# Patient Record
Sex: FEMALE | Race: BLACK OR AFRICAN AMERICAN | NOT HISPANIC OR LATINO | Employment: FULL TIME | URBAN - METROPOLITAN AREA
[De-identification: names, ages, dates, MRNs, and addresses within clinical notes are randomized per-mention and may not be internally consistent; named-entity substitution may affect disease eponyms.]

---

## 2017-05-26 ENCOUNTER — OFFICE VISIT (OUTPATIENT)
Dept: OBSTETRICS AND GYNECOLOGY | Facility: CLINIC | Age: 32
End: 2017-05-26
Payer: COMMERCIAL

## 2017-05-26 VITALS
HEIGHT: 61 IN | WEIGHT: 222.25 LBS | DIASTOLIC BLOOD PRESSURE: 82 MMHG | BODY MASS INDEX: 41.96 KG/M2 | SYSTOLIC BLOOD PRESSURE: 116 MMHG

## 2017-05-26 DIAGNOSIS — N89.8 VAGINAL DISCHARGE: ICD-10-CM

## 2017-05-26 DIAGNOSIS — Z12.4 ROUTINE PAPANICOLAOU SMEAR: Primary | ICD-10-CM

## 2017-05-26 LAB
C TRACH DNA SPEC QL NAA+PROBE: NOT DETECTED
N GONORRHOEA DNA SPEC QL NAA+PROBE: NOT DETECTED

## 2017-05-26 PROCEDURE — 88175 CYTOPATH C/V AUTO FLUID REDO: CPT

## 2017-05-26 PROCEDURE — 87591 N.GONORRHOEAE DNA AMP PROB: CPT

## 2017-05-26 PROCEDURE — 99395 PREV VISIT EST AGE 18-39: CPT | Mod: S$GLB,,, | Performed by: OBSTETRICS & GYNECOLOGY

## 2017-05-26 PROCEDURE — 99999 PR PBB SHADOW E&M-EST. PATIENT-LVL II: CPT | Mod: PBBFAC,,, | Performed by: OBSTETRICS & GYNECOLOGY

## 2017-05-26 PROCEDURE — 87480 CANDIDA DNA DIR PROBE: CPT

## 2017-05-26 NOTE — PROGRESS NOTES
"HPI:   31 y.o.   OB History      Para Term  AB Living    0         SAB TAB Ectopic Multiple Live Births               Patient's last menstrual period was 2017 (exact date).    Patient is  here for her annual gynecologic exam.  She has no complaints.     ROS:  GENERAL: No fever, chills, fatigability or weight loss.  SKIN: No rashes, itching or changes in color or texture of skin.  HEAD: No headaches or recent head trauma.  EYES: Visual acuity fine. No photophobia, ocular pain or diplopia.  EARS: Denies ear pain, discharge or vertigo.  NOSE: No loss of smell, no epistaxis or postnasal drip.  MOUTH & THROAT: No hoarseness or change in voice. No excessive gum bleeding.  NODES: Denies swollen glands.  CHEST: Denies RAMIREZ, cyanosis, wheezing, cough and sputum production.  CARDIOVASCULAR: Denies chest pain, PND, orthopnea or reduced exercise tolerance.  ABDOMEN: Appetite fine. No weight loss. Denies diarrhea, abdominal pain, hematemesis or blood in stool.  URINARY: No flank pain, dysuria or hematuria.  PERIPHERAL VASCULAR: No claudication or cyanosis.  MUSCULOSKELETAL: No joint stiffness or swelling. Denies back pain.  NEUROLOGIC: No history of seizures, paralysis, alteration of gait or coordination.    PE:   /82   Ht 5' 1" (1.549 m)   Wt 100.8 kg (222 lb 3.6 oz)   LMP 2017 (Exact Date)   BMI 41.99 kg/m²   APPEARANCE: Well nourished, well developed, in no acute distress.  NECK: Neck symmetric without masses or thyromegaly.  BREASTS: Symmetrical, no skin changes or visible lesions. No palpable masses, nipple discharge or adenopathy bilaterally.  ABDOMEN: Flat. Soft. No tenderness or masses. No hepatosplenomegaly. No hernias. No CVA tenderness.  VULVA: No lesions. Normal female genitalia.  URETHRAL MEATUS: Normal size and location, no lesions, no prolapse.  URETHRA: No masses, tenderness, prolapse or scarring.  VAGINA: Moist and well rugated, no discharge, no significant cystocele or " rectocele.  CERVIX: No lesions and discharge. PAP done.  UTERUS: Normal size, regular shape, mobile, non-tender, bladder base nontender.  ADNEXA: No masses, tenderness or CDS nodularity.  ANUS PERINEUM: Normal.    PROCEDURES:  Pap smear    Assessment:  Normal Gynecologic Exam    Plan:  Mammogram and Colonoscopy if indicated by current recommendations.  Return to clinic in one year or for any problems or complaints.  1. Cultures dohne, rec yeast, no dc seen  2. Trying for pregnancy, ocpsin past, long hx of irrg cycles, may need clomid?

## 2017-05-27 LAB
CANDIDA RRNA VAG QL PROBE: NEGATIVE
G VAGINALIS RRNA GENITAL QL PROBE: NEGATIVE
T VAGINALIS RRNA GENITAL QL PROBE: NEGATIVE

## 2017-05-29 ENCOUNTER — TELEPHONE (OUTPATIENT)
Dept: OBSTETRICS AND GYNECOLOGY | Facility: CLINIC | Age: 32
End: 2017-05-29

## 2017-05-29 ENCOUNTER — HOSPITAL ENCOUNTER (OUTPATIENT)
Dept: RADIOLOGY | Facility: HOSPITAL | Age: 32
Discharge: HOME | End: 2017-05-29
Attending: UROLOGY
Payer: COMMERCIAL

## 2017-05-29 ENCOUNTER — OFFICE VISIT (OUTPATIENT)
Dept: UROLOGY | Facility: CLINIC | Age: 32
End: 2017-05-29
Payer: COMMERCIAL

## 2017-05-29 VITALS
BODY MASS INDEX: 41.91 KG/M2 | SYSTOLIC BLOOD PRESSURE: 116 MMHG | HEIGHT: 61 IN | TEMPERATURE: 97 F | HEART RATE: 83 BPM | WEIGHT: 222 LBS | DIASTOLIC BLOOD PRESSURE: 78 MMHG

## 2017-05-29 DIAGNOSIS — M54.9 BACK PAIN, UNSPECIFIED BACK LOCATION, UNSPECIFIED BACK PAIN LATERALITY, UNSPECIFIED CHRONICITY: ICD-10-CM

## 2017-05-29 DIAGNOSIS — N20.0 NEPHROLITHIASIS: Primary | ICD-10-CM

## 2017-05-29 DIAGNOSIS — N20.0 NEPHROLITHIASIS: ICD-10-CM

## 2017-05-29 DIAGNOSIS — Z87.440 HISTORY OF UTI: ICD-10-CM

## 2017-05-29 DIAGNOSIS — R31.29 MICROHEMATURIA: ICD-10-CM

## 2017-05-29 LAB
BACTERIA #/AREA URNS AUTO: ABNORMAL /HPF
CAOX CRY UR QL COMP ASSIST: ABNORMAL
MICROSCOPIC COMMENT: ABNORMAL
RBC #/AREA URNS AUTO: 2 /HPF (ref 0–4)
SQUAMOUS #/AREA URNS AUTO: 5 /HPF
WBC #/AREA URNS AUTO: 1 /HPF (ref 0–5)

## 2017-05-29 PROCEDURE — 51798 US URINE CAPACITY MEASURE: CPT | Mod: S$GLB,,, | Performed by: UROLOGY

## 2017-05-29 PROCEDURE — 99999 PR PBB SHADOW E&M-EST. PATIENT-LVL III: CPT | Mod: PBBFAC,,, | Performed by: UROLOGY

## 2017-05-29 PROCEDURE — 87086 URINE CULTURE/COLONY COUNT: CPT

## 2017-05-29 PROCEDURE — 74000 XR ABDOMEN AP 1 VIEW: CPT | Mod: TC

## 2017-05-29 PROCEDURE — 99215 OFFICE O/P EST HI 40 MIN: CPT | Mod: 25,S$GLB,, | Performed by: UROLOGY

## 2017-05-29 PROCEDURE — 81001 URINALYSIS AUTO W/SCOPE: CPT

## 2017-05-29 PROCEDURE — 74000 XR ABDOMEN AP 1 VIEW: CPT | Mod: 26,,, | Performed by: RADIOLOGY

## 2017-05-29 NOTE — PROGRESS NOTES
HPI:  Lynn Green is a 31 y.o. year old female that  presents with No chief complaint on file.  .  This patient refers himself to the office with history of kidney stones and urinary tract infection.  She had negative workup for microscopic hematuria in February of last year by Dr. [facility].  Cytology was negative at that time.  As patient is new to me.    Patient spontaneously passed a kidney stone approximately 2 years ago.  She currently has chief complaint of bilateral low back pain which is worse in the morning.  She has no nausea vomiting or gross hematuria    He states that she recently finished a course of antibiotics for urinary tract infection.  She does have occasional dysuria.  Bladder scan postvoid residual today is 0    Further chart review shows a GFR greater than 60 in November 2015.  In May of this year she had a negative GC and Chlamydia culture.  In April of last year a renal stone study showed bilateral nonobstructing small kidney stones      Past Medical History:   Diagnosis Date    Allergy     Cystitis     Heart murmur     as a child    History of hematuria     Kidney stones     Kidney stones     Urinary tract infection      Social History     Social History    Marital status: Single     Spouse name: N/A    Number of children: N/A    Years of education: N/A     Occupational History     [employer]     Social History Main Topics    Smoking status: Never Smoker    Smokeless tobacco: Never Used    Alcohol use Yes    Drug use: No    Sexual activity: Yes     Partners: Male     Birth control/ protection: None      Comment: Single.     Other Topics Concern    Not on file     Social History Narrative    No narrative on file     History reviewed. No pertinent surgical history.  Family History   Problem Relation Age of Onset    Diabetes Mother     Hypertension Father     Prostate cancer Father     Breast cancer Neg Hx     Colon cancer Neg Hx   "   Ovarian cancer Neg Hx     Kidney disease Neg Hx            Review of Systems  The patient has no chest pains.  The patient has no shortness of breath  Patient wears        glasses.  All other review of systems are negative.      Physical Exam:  /78   Pulse 83   Temp 97.2 °F (36.2 °C)   Ht 5' 1" (1.549 m)   Wt 100.7 kg (222 lb)   LMP 05/20/2017 (Exact Date)   BMI 41.95 kg/m²   General appearance: alert, cooperative, no distress  Constitutional:Oriented to person, place, and time.appears well-developed and well-nourished.   HEENT: Normocephalic, atraumatic, neck symmetrical, no nasal discharge   Eyes: conjunctivae/corneas clear, PERRL, EOM's intact  Lungs: clear to auscultation bilaterally, no dullness to percussion bilaterally  Heart: regular rate and rhythm without rub; no displacement of the PMI   Abdomen: soft, non-tender; bowel sounds normoactive; no organomegaly  :Urethral meatus without lesion, no urethral masses noted, bladder nontender /nondistended, vagina normal appearing,      no      cystocele, anus and perineum without lesions, no adnexal or uterine masses noted.  Extremities: extremities symmetric; no clubbing, cyanosis, or edema  Integument: Skin color, texture, turgor normal; no rashes; hair distrubution normal  Neurologic: Alert and oriented X 3, normal strength, normal coordination and gait  Psychiatric: no pressured speech; normal affect; no evidence of impaired cognition     LABS:    Complete Blood Count  Lab Results   Component Value Date    RBC 4.52 11/12/2015    HGB 12.9 11/12/2015    HCT 39.4 11/12/2015    MCV 87 11/12/2015    MCH 28.5 11/12/2015    MCHC 32.7 11/12/2015    RDW 14.0 11/12/2015     11/12/2015    MPV 9.5 11/12/2015    GRAN 2.0 11/12/2015    GRAN 40.7 11/12/2015    LYMPH 2.4 11/12/2015    LYMPH 48.8 (H) 11/12/2015    MONO 0.4 11/12/2015    MONO 7.7 11/12/2015    EOS 0.1 11/12/2015    BASO 0.05 11/12/2015    EOSINOPHIL 1.8 11/12/2015    BASOPHIL 1.0 " 11/12/2015    DIFFMETHOD Automated 11/12/2015       Comprehensive Metabolic Panel  Lab Results   Component Value Date    GLU 92 11/12/2015    BUN 7 11/12/2015    CREATININE 0.7 11/12/2015     11/12/2015    K 3.8 11/12/2015     11/12/2015    PROT 7.6 11/12/2015    ALBUMIN 3.8 11/12/2015    BILITOT 0.3 11/12/2015    AST 18 11/12/2015    ALKPHOS 103 11/12/2015    CO2 27 11/12/2015    ALT 17 11/12/2015    ANIONGAP 7 (L) 11/12/2015    EGFRNONAA >60 11/12/2015    ESTGFRAFRICA >60 11/12/2015       PSA  No results found for: PSA      Assessment:    ICD-10-CM ICD-9-CM    1. Nephrolithiasis N20.0 592.0 X-Ray Abdomen AP 1 View   2. Back pain, unspecified back location, unspecified back pain laterality, unspecified chronicity M54.9 724.5    3. Microhematuria R31.29 599.72 Urinalysis Microscopic      Urine culture   4. History of UTI Z87.440 V13.02 Bladder scan     The primary encounter diagnosis was Nephrolithiasis. Diagnoses of Back pain, unspecified back location, unspecified back pain laterality, unspecified chronicity, Microhematuria, and History of UTI were also pertinent to this visit.      Plan: Numbers 1 and 2 nephrolithiasis and back pain.  Plan.  I discussed with the patient that her symptoms do not sound like spontaneous passage of a ureteral stone.  I offered to proceed with stone protocol CT versus check of KUB.  Patient voices understanding of options and wants to get a KUB which I think reasonable.  I recommend Tylenol or Motrin for low back pain    #3 microhematuria.  Plan.  Patient has already had a negative workup would repeat workup not needed    #4.  History of urinary tract infection.Patient's urine will be cultured and once results are available ,we will contact the patient if antibiotics are indicated.    Follow-up 3 months or sooner if needed  Orders Placed This Encounter   Procedures    Urine culture    X-Ray Abdomen AP 1 View    Urinalysis Microscopic    Bladder scan           Reilly PABON  CHEN Watkins MD    PLEASE NOTE:  Please be advised that portions of this note were dictated using voice recognition software and may contain dictation related errors in spelling/grammar/appropriate pronouns/syntax or other errors that might have not been found and or corrected on text review.

## 2017-05-29 NOTE — TELEPHONE ENCOUNTER
----- Message from Michael A. Wiedemann, MD sent at 5/29/2017  3:54 PM CDT -----  Yay, tell cultures negatvie for std and yeas/bv, yay

## 2017-05-31 LAB — BACTERIA UR CULT: NORMAL

## 2017-09-21 ENCOUNTER — OFFICE VISIT (OUTPATIENT)
Dept: OBSTETRICS AND GYNECOLOGY | Facility: CLINIC | Age: 32
End: 2017-09-21
Payer: COMMERCIAL

## 2017-09-21 VITALS
SYSTOLIC BLOOD PRESSURE: 116 MMHG | WEIGHT: 220.38 LBS | HEART RATE: 73 BPM | DIASTOLIC BLOOD PRESSURE: 80 MMHG | RESPIRATION RATE: 13 BRPM | HEIGHT: 61 IN | BODY MASS INDEX: 41.61 KG/M2

## 2017-09-21 DIAGNOSIS — N76.0 VAGINOSIS: ICD-10-CM

## 2017-09-21 DIAGNOSIS — R10.2 PELVIC PAIN IN FEMALE: Primary | ICD-10-CM

## 2017-09-21 LAB
BILIRUB SERPL-MCNC: ABNORMAL MG/DL
BLOOD URINE, POC: ABNORMAL
COLOR, POC UA: ABNORMAL
GLUCOSE UR QL STRIP: ABNORMAL
KETONES UR QL STRIP: ABNORMAL
LEUKOCYTE ESTERASE URINE, POC: ABNORMAL
NITRITE, POC UA: ABNORMAL
PH, POC UA: 6
PROTEIN, POC: ABNORMAL
SPECIFIC GRAVITY, POC UA: 1.01
UROBILINOGEN, POC UA: ABNORMAL

## 2017-09-21 PROCEDURE — 81002 URINALYSIS NONAUTO W/O SCOPE: CPT | Mod: S$GLB,,, | Performed by: OBSTETRICS & GYNECOLOGY

## 2017-09-21 PROCEDURE — 87480 CANDIDA DNA DIR PROBE: CPT

## 2017-09-21 PROCEDURE — 81001 URINALYSIS AUTO W/SCOPE: CPT | Mod: S$GLB,,, | Performed by: OBSTETRICS & GYNECOLOGY

## 2017-09-21 PROCEDURE — 99999 PR PBB SHADOW E&M-EST. PATIENT-LVL III: CPT | Mod: PBBFAC,,, | Performed by: OBSTETRICS & GYNECOLOGY

## 2017-09-21 PROCEDURE — 87660 TRICHOMONAS VAGIN DIR PROBE: CPT

## 2017-09-21 PROCEDURE — 3008F BODY MASS INDEX DOCD: CPT | Mod: S$GLB,,, | Performed by: OBSTETRICS & GYNECOLOGY

## 2017-09-21 PROCEDURE — 99213 OFFICE O/P EST LOW 20 MIN: CPT | Mod: 25,S$GLB,, | Performed by: OBSTETRICS & GYNECOLOGY

## 2017-09-21 RX ORDER — FLUCONAZOLE 150 MG/1
150 TABLET ORAL ONCE
Qty: 1 TABLET | Refills: 0 | Status: SHIPPED | OUTPATIENT
Start: 2017-09-21 | End: 2017-09-21

## 2017-09-21 NOTE — PROGRESS NOTES
Subjective:    Patient ID: Lynn Green is a 31 y.o. y.o. female.     Chief Complaint:   Chief Complaint   Patient presents with    Pelvic Pain     on the sides    Vaginal Itching    Vaginal Discharge       History of Present Illness   Lynn presents today complaining of pelvic pain, suprapubic and LLQ for about 1 week. She describes the suprapubic pain as a pressure-like feeling in her bladder. She also reports about 1 day of vaginal discharge with itching.  Denies dysuria, but does have itching with urination.    Patient's last menstrual period was 09/01/2017. She also reports that she and her partner have been trying to conceive for about 1 month.    ROS:   CONSTITUTIONAL: Negative for fever, chills, diaphoresis, weakness, fatigue, weight loss, weight gain  GASTROINTESTINAL: negative for abdominal pain, flank pain, nausea, vomiting, diarrhea, constipation, black stool, blood in stool  BREAST: negative for breast  tenderness, breast mass, nipple discharge, or skin changes  GENITOURINARY: genital discharge, pelvic pain, Denies: dysuria, frequency/urgency, hematuria  HEMATOLOGIC/LYMPHATIC: negative for swollen lymph nodes, bleeding, bruising  MUSCULOSKELETAL: negative for back pain, joint pain, joint stiffness, joint swelling, muscle pain, muscle weakness  ENDOCRINE: negative for polydipsia/polyuria, palpitations, skin changes, temperature intolerance, unexpected weight changes      Objective:    Vital Signs:  Vitals:    09/21/17 0733   BP: 116/80   Pulse: 73   Resp: 13       Physical Exam:  General:  alert, cooperative, no distress   Neuro/Psych: AAOx3, appropriate mood and affect   Head: Normocephalic, atraumatic   Neck: Supple, normal ROM   Resp: Normal effort   Skin:  Skin color, texture, turgor normal. No rashes or lesions   Abdomen:  soft, nontender, no palpable masses   Pelvis: External genitalia: normal general appearance  Urinary system: urethral meatus normal, bladder nontender  Vaginal: normal  mucosa without prolapse or lesions, discharge, white and thick  Cervix: normal appearance, no CMT  Uterus: normal size, shape, position  Adnexa: normal size, nontender bilaterally         Assessment:      1. Pelvic pain in female    2. Vaginosis          Plan:      Pelvic pain in female  -     POCT urinalysis, dipstick or tablet reag    Vaginosis  -     Vaginosis Screen by DNA Probe  -     fluconazole (DIFLUCAN) 150 MG Tab; Take 1 tablet (150 mg total) by mouth once.  Dispense: 1 tablet; Refill: 0      We also discussed starting a PNV if TTC.

## 2017-10-15 ENCOUNTER — HOSPITAL ENCOUNTER (EMERGENCY)
Facility: HOSPITAL | Age: 32
Discharge: HOME | End: 2017-10-15
Attending: EMERGENCY MEDICINE
Payer: COMMERCIAL

## 2017-10-15 VITALS
HEIGHT: 62 IN | HEART RATE: 80 BPM | BODY MASS INDEX: 40.12 KG/M2 | TEMPERATURE: 98 F | SYSTOLIC BLOOD PRESSURE: 150 MMHG | RESPIRATION RATE: 17 BRPM | DIASTOLIC BLOOD PRESSURE: 85 MMHG | WEIGHT: 218 LBS

## 2017-10-15 DIAGNOSIS — N30.01 ACUTE CYSTITIS WITH HEMATURIA: Primary | ICD-10-CM

## 2017-10-15 LAB
B-HCG UR QL: NEGATIVE
BACTERIA #/AREA URNS HPF: ABNORMAL /HPF
BILIRUB UR QL STRIP: NEGATIVE
CLARITY UR: CLEAR
COLOR UR: ABNORMAL
GLUCOSE UR QL STRIP: ABNORMAL
HGB UR QL STRIP: ABNORMAL
HYALINE CASTS #/AREA URNS LPF: 0 /LPF
KETONES UR QL STRIP: NEGATIVE
LEUKOCYTE ESTERASE UR QL STRIP: ABNORMAL
MICROSCOPIC COMMENT: ABNORMAL
NITRITE UR QL STRIP: NEGATIVE
PH UR STRIP: 7 [PH] (ref 5–8)
PROT UR QL STRIP: ABNORMAL
RBC #/AREA URNS HPF: >100 /HPF (ref 0–4)
SP GR UR STRIP: 1.01 (ref 1–1.03)
SQUAMOUS #/AREA URNS HPF: 1 /HPF
URN SPEC COLLECT METH UR: ABNORMAL
UROBILINOGEN UR STRIP-ACNC: NEGATIVE EU/DL
WBC #/AREA URNS HPF: >100 /HPF (ref 0–5)

## 2017-10-15 PROCEDURE — 87086 URINE CULTURE/COLONY COUNT: CPT

## 2017-10-15 PROCEDURE — 81000 URINALYSIS NONAUTO W/SCOPE: CPT

## 2017-10-15 PROCEDURE — 87186 SC STD MICRODIL/AGAR DIL: CPT

## 2017-10-15 PROCEDURE — 99283 EMERGENCY DEPT VISIT LOW MDM: CPT | Mod: 25

## 2017-10-15 PROCEDURE — 81025 URINE PREGNANCY TEST: CPT

## 2017-10-15 PROCEDURE — 96372 THER/PROPH/DIAG INJ SC/IM: CPT

## 2017-10-15 PROCEDURE — 63600175 PHARM REV CODE 636 W HCPCS: Performed by: EMERGENCY MEDICINE

## 2017-10-15 PROCEDURE — 87077 CULTURE AEROBIC IDENTIFY: CPT

## 2017-10-15 PROCEDURE — 87088 URINE BACTERIA CULTURE: CPT

## 2017-10-15 RX ORDER — GENTAMICIN SULFATE 40 MG/ML
80 INJECTION, SOLUTION INTRAMUSCULAR; INTRAVENOUS
Status: DISCONTINUED | OUTPATIENT
Start: 2017-10-15 | End: 2017-10-15

## 2017-10-15 RX ORDER — GENTAMICIN SULFATE 10 MG/ML
80 INJECTION, SOLUTION INTRAMUSCULAR; INTRAVENOUS
Status: DISCONTINUED | OUTPATIENT
Start: 2017-10-15 | End: 2017-10-15

## 2017-10-15 RX ORDER — NITROFURANTOIN 25; 75 MG/1; MG/1
100 CAPSULE ORAL 2 TIMES DAILY
Qty: 20 CAPSULE | Refills: 0 | Status: SHIPPED | OUTPATIENT
Start: 2017-10-15 | End: 2017-10-25

## 2017-10-15 RX ADMIN — GENTAMICIN SULFATE 80 MG: 40 INJECTION, SOLUTION INTRAMUSCULAR; INTRAVENOUS at 03:10

## 2017-10-15 NOTE — ED PROVIDER NOTES
Encounter Date: 10/15/2017       History     Chief Complaint   Patient presents with    Dysuria     States has been having pain on urination for about 24 hours. States has nausea but no vomiting.     This 31-year-old black female with a history of urinary tract infections complains she's been having dysuria for about 24 hours and discomfort and a little bit of left flank pain.  She denies any fever nausea or vomiting.  She has no other medical problems.  She denies pregnancy.          Review of patient's allergies indicates:   Allergen Reactions    Bactrim [sulfamethoxazole-trimethoprim] Hives     Hives (skin)^     Past Medical History:   Diagnosis Date    Allergy     Cystitis     Heart murmur     as a child    History of hematuria     Kidney stones     Kidney stones     Urinary tract infection      Past Surgical History:   Procedure Laterality Date    CYSTOSCOPY       Family History   Problem Relation Age of Onset    Diabetes Mother     Hypertension Father     Prostate cancer Father     Cancer Father      bladder cancer    Breast cancer Neg Hx     Colon cancer Neg Hx     Ovarian cancer Neg Hx     Kidney disease Neg Hx      Social History   Substance Use Topics    Smoking status: Never Smoker    Smokeless tobacco: Never Used    Alcohol use Yes      Comment: socially     Review of Systems   Genitourinary: Positive for dysuria, flank pain and frequency.   All other systems reviewed and are negative.      Physical Exam     Initial Vitals [10/15/17 0249]   BP Pulse Resp Temp SpO2   (!) 166/99 86 20 97.6 °F (36.4 °C) --      MAP       121.33         Physical Exam    Nursing note and vitals reviewed.  Constitutional: She appears well-developed and well-nourished.   HENT:   Mouth/Throat: Oropharynx is clear and moist.   Eyes: EOM are normal. Pupils are equal, round, and reactive to light.   Neck: Normal range of motion. Neck supple.   Cardiovascular: Normal rate and regular rhythm.   Pulmonary/Chest:  Breath sounds normal. She has no wheezes.   Abdominal: Soft. Bowel sounds are normal.   Musculoskeletal: Normal range of motion. She exhibits no edema.   Neurological: She is alert and oriented to person, place, and time.   Skin: Skin is warm and dry.   Psychiatric: She has a normal mood and affect.         ED Course   Procedures  Labs Reviewed   URINALYSIS - Abnormal; Notable for the following:        Result Value    Color, UA Red (*)     Protein, UA 2+ (*)     Glucose, UA Trace (*)     Occult Blood UA 3+ (*)     Leukocytes, UA 1+ (*)     All other components within normal limits   URINALYSIS MICROSCOPIC - Abnormal; Notable for the following:     RBC, UA >100 (*)     WBC, UA >100 (*)     Bacteria, UA Moderate (*)     All other components within normal limits   PREGNANCY TEST, URINE RAPID                               ED Course      Clinical Impression:   The encounter diagnosis was Acute cystitis with hematuria.    Disposition:   Disposition: Discharged  Condition: Stable                        Kenneth Somers MD  10/15/17 0351

## 2017-10-17 LAB — BACTERIA UR CULT: NORMAL

## 2017-10-18 NOTE — PHYSICIAN QUERY
PT Name: Lynn Green  MR #: [id number]     Physician Query Form - Documentation Clarification      CDS/: Jaelyn Angel               Contact information:[email or web address]    This form is a permanent document in the medical record.     Query Date: October 18, 2017    By submitting this query, we are merely seeking further clarification of documentation. Please utilize your independent clinical judgment when addressing the question(s) below.    The Medical record reflects the following:    Supporting Clinical Findings Location in Medical Record   ESCHERICHIA COLI  >100,000 cfu/ml       Urine Culture     The encounter diagnosis was Acute cystitis with hematuria       ED Summary                                                                            Dr. Somers,   Please review the urine culture and add any additional diagnosis if necessary.    Provider Use Only            No further Dx needed.                                                                                                                   [  ] Clinically undetermined

## 2017-12-18 ENCOUNTER — OFFICE VISIT (OUTPATIENT)
Dept: URGENT CARE | Facility: CLINIC | Age: 32
End: 2017-12-18

## 2017-12-18 VITALS
OXYGEN SATURATION: 98 % | HEIGHT: 61 IN | SYSTOLIC BLOOD PRESSURE: 125 MMHG | HEART RATE: 89 BPM | BODY MASS INDEX: 41.16 KG/M2 | RESPIRATION RATE: 18 BRPM | TEMPERATURE: 98 F | DIASTOLIC BLOOD PRESSURE: 89 MMHG | WEIGHT: 218 LBS

## 2017-12-18 DIAGNOSIS — N30.00 ACUTE CYSTITIS WITHOUT HEMATURIA: ICD-10-CM

## 2017-12-18 DIAGNOSIS — R30.0 DYSURIA: Primary | ICD-10-CM

## 2017-12-18 LAB
B-HCG UR QL: NEGATIVE
BILIRUB UR QL STRIP: NEGATIVE
CTP QC/QA: YES
GLUCOSE UR QL STRIP: NEGATIVE
KETONES UR QL STRIP: NEGATIVE
LEUKOCYTE ESTERASE UR QL STRIP: POSITIVE
PH, POC UA: 5.5 (ref 5–8)
POC BLOOD, URINE: POSITIVE
POC NITRATES, URINE: ABNORMAL
PROT UR QL STRIP: POSITIVE
SP GR UR STRIP: 1.02 (ref 1–1.03)
UROBILINOGEN UR STRIP-ACNC: NORMAL (ref 0.1–1.1)

## 2017-12-18 PROCEDURE — 81003 URINALYSIS AUTO W/O SCOPE: CPT | Mod: QW,S$GLB,, | Performed by: NURSE PRACTITIONER

## 2017-12-18 PROCEDURE — 81025 URINE PREGNANCY TEST: CPT | Mod: S$GLB,,, | Performed by: NURSE PRACTITIONER

## 2017-12-18 PROCEDURE — 99213 OFFICE O/P EST LOW 20 MIN: CPT | Mod: 25,S$GLB,, | Performed by: NURSE PRACTITIONER

## 2017-12-18 RX ORDER — PHENAZOPYRIDINE HYDROCHLORIDE 200 MG/1
200 TABLET, FILM COATED ORAL
Qty: 6 TABLET | Refills: 0 | Status: SHIPPED | OUTPATIENT
Start: 2017-12-18 | End: 2018-04-12 | Stop reason: ALTCHOICE

## 2017-12-18 RX ORDER — NITROFURANTOIN 25; 75 MG/1; MG/1
100 CAPSULE ORAL 2 TIMES DAILY
Qty: 14 CAPSULE | Refills: 0 | Status: SHIPPED | OUTPATIENT
Start: 2017-12-18 | End: 2018-04-12 | Stop reason: SDUPTHER

## 2017-12-18 NOTE — PATIENT INSTRUCTIONS
Understanding Urinary Tract Infections (UTIs)  Most UTIs are caused by bacteria, although they may also be caused by viruses or fungi. Bacteria from the bowel are the most common source of infection. The infection may start because of any of the following:  · Sexual activity. During sex, bacteria can travel from the penis, vagina, or rectum into the urethra.   · Bacteria on the skin outside the rectum may travel into the urethra. This is more common in women since the rectum and urethra are closer to each other than in men. Wiping from front to back after using the toilet and keeping the area clean can help prevent germs from getting to the urethra.  · Blockage of urine flow through the urinary tract. If urine sits too long, germs may start to grow out of control.      Parts of the urinary tract  The infection can occur in any part of the urinary tract.  · The kidneys collect and store urine.  · The ureters carry urine from the kidneys to the bladder.  · The bladder holds urine until you are ready to let it out.  · The urethra carries urine from the bladder out of the body. It is shorter in women, so bacteria can move through it more easily. The urethra is longer in men, so a UTI is less likely to reach the bladder or kidneys in men.  Date Last Reviewed: 1/1/2017  © 8004-2027 [employer]. [address] [place] [address], [place] [address]. All rights reserved. This information is not intended as a substitute for professional medical care. Always follow your healthcare professional's instructions.

## 2017-12-18 NOTE — PROGRESS NOTES
"Subjective:       Patient ID: Lynn Green is a 32 y.o. female.    Vitals:  height is 5' 1" (1.549 m) and weight is 98.9 kg (218 lb).     Chief Complaint: Dysuria    This is a 32 y.o. female with Past Medical History:  No date: Allergy  No date: Cystitis  No date: Heart murmur      Comment: as a child  No date: History of hematuria  No date: Kidney stones  No date: Kidney stones  No date: Urinary tract infection   who presents today with a chief complaint of Dysuria.    Trying to get pregnant. Last cycle 12/1. UPT today negative.       Dysuria    This is a new problem. The current episode started acute onset. The problem has been unchanged. The quality of the pain is described as aching and burning. There has been no fever. She is sexually active. There is a history of pyelonephritis. Associated symptoms include frequency, hematuria and urgency. Pertinent negatives include no chills, nausea or vomiting. She has tried nothing for the symptoms.     Review of Systems   Constitution: Negative for chills and fever.   Skin: Negative for itching.   Musculoskeletal: Positive for back pain.   Gastrointestinal: Positive for abdominal pain. Negative for nausea and vomiting.   Genitourinary: Positive for dysuria, frequency, hematuria and urgency. Negative for genital sores, missed menses and non-menstrual bleeding.       Objective:      Physical Exam   Constitutional: She is oriented to person, place, and time. She appears well-developed and well-nourished.   HENT:   Head: Normocephalic and atraumatic.   Cardiovascular: Normal rate, regular rhythm and normal heart sounds.    Pulmonary/Chest: Effort normal and breath sounds normal.   Abdominal: Soft. Bowel sounds are normal. There is no tenderness.   Musculoskeletal: She exhibits no edema.   Neurological: She is alert and oriented to person, place, and time.   Skin: Skin is warm and dry.   Psychiatric: She has a normal mood and affect. Her behavior is normal. Judgment and " thought content normal.   Nursing note and vitals reviewed.      Assessment:       1. Dysuria    2. Acute cystitis without hematuria        Plan:         1. Dysuria    - POCT Urinalysis, Dipstick, Automated, W/O Scope  - POCT urine pregnancy    2. Acute cystitis without hematuria  Advised will put on macrobid since safe in pregnancy and could be very early. Also will culture to assure this will work. It was sens at last check.   - nitrofurantoin, macrocrystal-monohydrate, (MACROBID) 100 MG capsule; Take 1 capsule (100 mg total) by mouth 2 (two) times daily.  Dispense: 14 capsule; Refill: 0  - Urine culture  - phenazopyridine (PYRIDIUM) 200 MG tablet; Take 1 tablet (200 mg total) by mouth 3 (three) times daily with meals.  Dispense: 6 tablet; Refill: 0

## 2017-12-18 NOTE — LETTER
December 18, 2017      [facility]  [place]  [address]  [place] [address]  Phone: [phone]  Fax: [phone]       Patient: Lynn Green   YOB: 1985  Date of Visit: 12/18/2017    To Whom It May Concern:    Harry Green  was at [facility] on 12/18/2017. She may return to work/school on 12/19/17 with no restrictions. If you have any questions or concerns, or if I can be of further assistance, please do not hesitate to contact me.    Sincerely,            Roxi Galeas NP

## 2017-12-21 ENCOUNTER — TELEPHONE (OUTPATIENT)
Dept: URGENT CARE | Facility: CLINIC | Age: 32
End: 2017-12-21

## 2018-04-12 ENCOUNTER — OFFICE VISIT (OUTPATIENT)
Dept: URGENT CARE | Facility: CLINIC | Age: 33
End: 2018-04-12
Payer: COMMERCIAL

## 2018-04-12 VITALS
TEMPERATURE: 98 F | WEIGHT: 215 LBS | BODY MASS INDEX: 40.59 KG/M2 | SYSTOLIC BLOOD PRESSURE: 114 MMHG | HEART RATE: 96 BPM | DIASTOLIC BLOOD PRESSURE: 70 MMHG | OXYGEN SATURATION: 98 % | RESPIRATION RATE: 20 BRPM | HEIGHT: 61 IN

## 2018-04-12 DIAGNOSIS — N30.01 ACUTE CYSTITIS WITH HEMATURIA: Primary | ICD-10-CM

## 2018-04-12 DIAGNOSIS — R30.0 DYSURIA: ICD-10-CM

## 2018-04-12 LAB
BILIRUB UR QL STRIP: NEGATIVE
GLUCOSE UR QL STRIP: NEGATIVE
KETONES UR QL STRIP: NEGATIVE
LEUKOCYTE ESTERASE UR QL STRIP: NEGATIVE
PH, POC UA: 6.5 (ref 5–8)
POC BLOOD, URINE: POSITIVE
POC NITRATES, URINE: NEGATIVE
PROT UR QL STRIP: POSITIVE
SP GR UR STRIP: 1.01 (ref 1–1.03)
UROBILINOGEN UR STRIP-ACNC: NORMAL (ref 0.1–1.1)

## 2018-04-12 PROCEDURE — 99214 OFFICE O/P EST MOD 30 MIN: CPT | Mod: SA,25,S$GLB, | Performed by: PHYSICIAN ASSISTANT

## 2018-04-12 PROCEDURE — 81003 URINALYSIS AUTO W/O SCOPE: CPT | Mod: QW,S$GLB,, | Performed by: PHYSICIAN ASSISTANT

## 2018-04-12 RX ORDER — NITROFURANTOIN 25; 75 MG/1; MG/1
100 CAPSULE ORAL 2 TIMES DAILY
Qty: 14 CAPSULE | Refills: 0 | Status: SHIPPED | OUTPATIENT
Start: 2018-04-12 | End: 2018-07-09

## 2018-04-12 RX ORDER — PHENAZOPYRIDINE HYDROCHLORIDE 200 MG/1
200 TABLET, FILM COATED ORAL
Qty: 6 TABLET | Refills: 0 | Status: SHIPPED | OUTPATIENT
Start: 2018-04-12 | End: 2018-07-09

## 2018-04-12 RX ORDER — PHENAZOPYRIDINE HYDROCHLORIDE 95 MG/1
95 TABLET ORAL 3 TIMES DAILY PRN
COMMUNITY
End: 2018-04-12 | Stop reason: ALTCHOICE

## 2018-04-12 NOTE — LETTER
April 12, 2018      [facility]  [place]  [address]  [place] [address]  Phone: [phone]  Fax: [phone]       Patient: Lynn Green   YOB: 1985  Date of Visit: 04/12/2018    To Whom It May Concern:    Harry Green  was at [facility] on 04/12/2018. She may return to work/school on 4/14/2018 with no restrictions. If you have any questions or concerns, or if I can be of further assistance, please do not hesitate to contact me.    Sincerely,    Winter Baker PA-C

## 2018-04-13 NOTE — PROGRESS NOTES
"Subjective:       Patient ID: Lynn Green is a 32 y.o. female.    Vitals:  height is 5' 1" (1.549 m) and weight is 97.5 kg (215 lb). Her oral temperature is 98.4 °F (36.9 °C). Her blood pressure is 114/70 and her pulse is 96. Her respiration is 20 and oxygen saturation is 98%.     Chief Complaint: Dysuria    Dysuria    This is a new problem. The current episode started acute onset. The problem occurs every urination. The problem has been unchanged. The quality of the pain is described as burning. The pain is at a severity of 3/10. The pain is mild. There has been no fever. She is sexually active. There is a history of pyelonephritis. Associated symptoms include frequency, hematuria and hesitancy. Pertinent negatives include no behavior changes, chills, discharge, flank pain, nausea, possible pregnancy, sweats, urgency, vomiting, weight loss, constipation, rash or withholding. Treatments tried: AZO. The treatment provided mild relief. Her past medical history is significant for kidney stones and recurrent UTIs.     Review of Systems   Constitution: Negative for chills, fever and weight loss.   Skin: Negative for itching and rash.   Musculoskeletal: Negative for back pain.   Gastrointestinal: Positive for abdominal pain. Negative for bloating, constipation, nausea and vomiting.   Genitourinary: Positive for dysuria, frequency, hematuria and hesitancy. Negative for flank pain, genital sores, missed menses, non-menstrual bleeding and urgency.       Objective:      Physical Exam   Constitutional: She is oriented to person, place, and time. She appears well-developed and well-nourished.   HENT:   Head: Normocephalic and atraumatic.   Right Ear: External ear normal.   Left Ear: External ear normal.   Nose: Nose normal.   Mouth/Throat: Mucous membranes are normal.   Eyes: Conjunctivae and lids are normal.   Neck: Trachea normal and full passive range of motion without pain. Neck supple.   Cardiovascular: Normal rate, " regular rhythm and normal heart sounds.    Pulmonary/Chest: Effort normal and breath sounds normal. No respiratory distress.   Abdominal: Soft. Normal appearance and bowel sounds are normal. She exhibits no distension, no abdominal bruit, no pulsatile midline mass and no mass. There is no tenderness. There is no rigidity, no rebound, no guarding, no CVA tenderness, no tenderness at McBurney's point and negative Villanueva's sign.   Musculoskeletal: Normal range of motion. She exhibits no edema.   Neurological: She is alert and oriented to person, place, and time. She has normal strength.   Skin: Skin is warm, dry and intact. She is not diaphoretic. No pallor.   Psychiatric: She has a normal mood and affect. Her speech is normal and behavior is normal. Judgment and thought content normal. Cognition and memory are normal.   Nursing note and vitals reviewed.      Assessment:       1. Acute cystitis with hematuria    2. Dysuria        Plan:         Acute cystitis with hematuria  -     nitrofurantoin, macrocrystal-monohydrate, (MACROBID) 100 MG capsule; Take 1 capsule (100 mg total) by mouth 2 (two) times daily.  Dispense: 14 capsule; Refill: 0  -     phenazopyridine (PYRIDIUM) 200 MG tablet; Take 1 tablet (200 mg total) by mouth 3 (three) times daily with meals.  Dispense: 6 tablet; Refill: 0    Dysuria  -     POCT Urinalysis, Dipstick, Automated, W/O Scope        Bladder Infection, Female (Adult)    Urine is normally doesn't have any bacteria in it. But bacteria can get into the urinary tract from the skin around the rectum. Or they can travel in the blood from elsewhere in the body. Once they are in your urinary tract, they can cause infection in the urethra (urethritis), the bladder (cystitis), or the kidneys (pyelonephritis).  The most common place for an infection is in the bladder. This is called a bladder infection. This is one of the most common infections in women. Most bladder infections are easily treated. They  "are not serious unless the infection spreads to the kidney.  The phrases "bladder infection," "UTI," and "cystitis" are often used to describe the same thing. But they are not always the same. Cystitis is an inflammation of the bladder. The most common cause of cystitis is an infection.  Symptoms  The infection causes inflammation in the urethra and bladder. This causes many of the symptoms. The most common symptoms of a bladder infection are:  · Pain or burning when urinating  · Having to urinate more often than usual  · Urgent need to urinate  · Only a small amount of urine comes out  · Blood in urine  · Abdominal discomfort. This is usually in the lower abdomen above the pubic bone.  · Cloudy urine  · Strong- or bad-smelling urine  · Unable to urinate (urinary retention)  · Unable to hold urine in (urinary incontinence)  · Fever  · Loss of appetite  · Confusion (in older adults)  Causes  Bladder infections are not contagious. You can't get one from someone else, from a toilet seat, or from sharing a bath.  The most common cause of bladder infections is bacteria from the bowels. The bacteria get onto the skin around the opening of the urethra. From there, they can get into the urine and travel up to the bladder, causing inflammation and infection. This usually happens because of:  · Wiping improperly after urinating. Always wipe from front to back.  · Bowel incontinence  · Pregnancy  · Procedures such as having a catheter inserted  · Older age  · Not emptying your bladder. This can allow bacteria a chance to grow in your urine.  · Dehydration  · Constipation  · Sex  · Use of a diaphragm for birth control   Treatment  Bladder infections are diagnosed by a urine test. They are treated with antibiotics and usually clear up quickly without complications. Treatment helps prevent a more serious kidney infection.  Medicines  Medicines can help in the treatment of a bladder infection:  · Take antibiotics until they are " used up, even if you feel better. It is important to finish them to make sure the infection has cleared.  · You can use acetaminophen or ibuprofen for pain, fever, or discomfort, unless another medicine was prescribed. If you have chronic liver or kidney disease, talk with your healthcare provider before using these medicines. Also talk with your provider if you've ever had a stomach ulcer or gastrointestinal bleeding, or are taking blood-thinner medicines.  · If you are given phenazopydridine to reduce burning with urination, it will cause your urine to become a bright orange color. This can stain clothing.  Care and prevention  These self-care steps can help prevent future infections:  · Drink plenty of fluids to prevent dehydration and flush out your bladder. Do this unless you must restrict fluids for other health reasons, or your doctor told you not to.  · Proper cleaning after going to the bathroom is important. Wipe from front to back after using the toilet to prevent the spread of bacteria.  · Urinate more often. Don't try to hold urine in for a long time.  · Wear loose-fitting clothes and cotton underwear. Avoid tight-fitting pants.  · Improve your diet and prevent constipation. Eat more fresh fruit and vegetables, and fiber, and less junk and fatty foods.  · Avoid sex until your symptoms are gone.  · Avoid caffeine, alcohol, and spicy foods. These can irritate your bladder.  · Urinate right after intercourse to flush out your bladder.  · If you use birth control pills and have frequent bladder infections, discuss it with your doctor.  Follow-up care  Call your healthcare provider if all symptoms are not gone after 3 days of treatment. This is especially important if you have repeat infections.  If a culture was done, you will be told if your treatment needs to be changed. If directed, you can call to find out the results.  If X-rays were done, you will be told if the results will affect  your treatment.  Call 911  Call 911 if any of the following occur:  · Trouble breathing  · Hard to wake up or confusion  · Fainting or loss of consciousness  · Rapid heart rate  When to seek medical advice  Call your healthcare provider right away if any of these occur:  · Fever of 100.4ºF (38.0ºC) or higher, or as directed by your healthcare provider  · Symptoms are not better by the third day of treatment  · Back or belly (abdominal) pain that gets worse  · Repeated vomiting, or unable to keep medicine down  · Weakness or dizziness  · Vaginal discharge  · Pain, redness, or swelling in the outer vaginal area (labia)  Date Last Reviewed: 10/1/2016  © 8060-1947 [employer]. [address] [place] [address], [place] [address]. All rights reserved. This information is not intended as a substitute for professional medical care. Always follow your healthcare professional's instructions.      Please follow up with your Primary care provider within 2-5 days if your signs and symptoms have not resolved or worsen.     If your condition worsens or fails to improve we recommend that you receive another evaluation at the emergency room immediately or contact your primary medical clinic to discuss your concerns.   You must understand that you have received an Urgent Care treatment only and that you may be released before all of your medical problems are known or treated. You, the patient, will arrange for follow up care as instructed.

## 2018-04-13 NOTE — PATIENT INSTRUCTIONS

## 2018-04-15 ENCOUNTER — TELEPHONE (OUTPATIENT)
Dept: URGENT CARE | Facility: CLINIC | Age: 33
End: 2018-04-15

## 2018-05-25 ENCOUNTER — OFFICE VISIT (OUTPATIENT)
Dept: URGENT CARE | Facility: CLINIC | Age: 33
End: 2018-05-25
Payer: COMMERCIAL

## 2018-05-25 VITALS
BODY MASS INDEX: 49.09 KG/M2 | TEMPERATURE: 102 F | HEIGHT: 61 IN | WEIGHT: 260 LBS | SYSTOLIC BLOOD PRESSURE: 125 MMHG | DIASTOLIC BLOOD PRESSURE: 79 MMHG | HEART RATE: 120 BPM | RESPIRATION RATE: 16 BRPM | OXYGEN SATURATION: 98 %

## 2018-05-25 DIAGNOSIS — J02.9 SORE THROAT: Primary | ICD-10-CM

## 2018-05-25 DIAGNOSIS — M54.9 BACK PAIN, UNSPECIFIED BACK LOCATION, UNSPECIFIED BACK PAIN LATERALITY, UNSPECIFIED CHRONICITY: ICD-10-CM

## 2018-05-25 DIAGNOSIS — R10.9 ABDOMINAL PAIN, UNSPECIFIED ABDOMINAL LOCATION: ICD-10-CM

## 2018-05-25 DIAGNOSIS — N39.0 URINARY TRACT INFECTION WITHOUT HEMATURIA, SITE UNSPECIFIED: ICD-10-CM

## 2018-05-25 LAB
BILIRUB UR QL STRIP: NEGATIVE
GLUCOSE UR QL STRIP: NEGATIVE
KETONES UR QL STRIP: POSITIVE
LEUKOCYTE ESTERASE UR QL STRIP: NEGATIVE
PH, POC UA: 5.5 (ref 5–8)
POC BLOOD, URINE: POSITIVE
POC NITRATES, URINE: NEGATIVE
PROT UR QL STRIP: POSITIVE
SP GR UR STRIP: 1.02 (ref 1–1.03)
UROBILINOGEN UR STRIP-ACNC: NORMAL (ref 0.1–1.1)

## 2018-05-25 PROCEDURE — 99214 OFFICE O/P EST MOD 30 MIN: CPT | Mod: 25,S$GLB,, | Performed by: NURSE PRACTITIONER

## 2018-05-25 PROCEDURE — 81003 URINALYSIS AUTO W/O SCOPE: CPT | Mod: QW,S$GLB,, | Performed by: NURSE PRACTITIONER

## 2018-05-25 PROCEDURE — 3008F BODY MASS INDEX DOCD: CPT | Mod: CPTII,S$GLB,, | Performed by: NURSE PRACTITIONER

## 2018-05-25 RX ORDER — NITROFURANTOIN (MACROCRYSTALS) 100 MG/1
100 CAPSULE ORAL 4 TIMES DAILY
Qty: 28 CAPSULE | Refills: 0 | Status: SHIPPED | OUTPATIENT
Start: 2018-05-25 | End: 2018-06-01

## 2018-05-25 RX ORDER — AZITHROMYCIN 500 MG/1
500 TABLET, FILM COATED ORAL DAILY
Qty: 3 TABLET | Refills: 0 | Status: SHIPPED | OUTPATIENT
Start: 2018-05-25 | End: 2018-05-28

## 2018-05-25 NOTE — PROGRESS NOTES
"Subjective:       Patient ID: Lynn Green is a 32 y.o. female.    Vitals:  height is 5' 1" (1.549 m) and weight is 117.9 kg (260 lb). Her oral temperature is 101.7 °F (38.7 °C) (abnormal). Her blood pressure is 125/79 and her pulse is 120 (abnormal). Her respiration is 16 and oxygen saturation is 98%.     Chief Complaint: Sore Throat; Dysuria; and Fever    Pt is 33 yo female with UTI s/s for 4 days; has had numerous UTI's in last year;  Denies seeing urologist;       Sore Throat    This is a new problem. The current episode started in the past 7 days. The problem has been gradually worsening. The maximum temperature recorded prior to her arrival was 101 - 101.9 F. Associated symptoms include congestion and ear pain. Pertinent negatives include no abdominal pain or vomiting. She has tried NSAIDs for the symptoms. The treatment provided no relief.   Dysuria    This is a new problem. The current episode started acute onset. The problem occurs every urination. The problem has been gradually worsening. The pain is at a severity of 8/10. The pain is moderate. The maximum temperature recorded prior to her arrival was 101 - 101.9 F. She is sexually active. There is no history of pyelonephritis. Associated symptoms include frequency. Pertinent negatives include no chills, hematuria, nausea, urgency, vomiting or rash. She has tried increased fluids and NSAIDs for the symptoms. The treatment provided no relief. Her past medical history is significant for kidney stones and recurrent UTIs.     Review of Systems   Constitution: Negative for chills, fever and malaise/fatigue.   HENT: Positive for congestion, ear pain and sore throat.    Skin: Negative for color change, itching and rash.   Musculoskeletal: Negative for back pain, muscle cramps, muscle weakness and stiffness.   Gastrointestinal: Negative for abdominal pain, bowel incontinence, nausea and vomiting.   Genitourinary: Positive for dysuria and frequency. Negative for " bladder incontinence, genital sores, hematuria, missed menses, non-menstrual bleeding and urgency.        Bilateral low back pain;   Neurological: Negative for disturbances in coordination and numbness.       Objective:      Physical Exam   Constitutional: She is oriented to person, place, and time. She appears well-developed and well-nourished. No distress.   HENT:   Head: Normocephalic.   Right Ear: External ear normal.   Left Ear: External ear normal.   Nose: Nose normal.   Mouth/Throat: Oropharynx is clear and moist.   Eyes: Conjunctivae and EOM are normal. Pupils are equal, round, and reactive to light. Right eye exhibits no discharge. Left eye exhibits no discharge.   Neck: Normal range of motion. Neck supple.   Cardiovascular: Normal rate, regular rhythm, normal heart sounds and intact distal pulses.  Exam reveals no friction rub.    No murmur heard.  Pulmonary/Chest: Effort normal and breath sounds normal. No respiratory distress. She has no wheezes. She has no rales. She exhibits no tenderness.   Abdominal: Soft.   Musculoskeletal: Normal range of motion.   Neurological: She is alert and oriented to person, place, and time.   Skin: Skin is warm and dry. She is not diaphoretic.   Psychiatric: She has a normal mood and affect. Her behavior is normal. Judgment and thought content normal.       Assessment:       1. Sore throat    2. Back pain, unspecified back location, unspecified back pain laterality, unspecified chronicity    3. Abdominal pain, unspecified abdominal location    4. Urinary tract infection without hematuria, site unspecified        Plan:     take all ABX as ordered; with probiotics;     Sore throat  -     azithromycin (ZITHROMAX) 500 MG tablet; Take 1 tablet (500 mg total) by mouth once daily. Take 1 tablet daily for 3 days.  Dispense: 3 tablet; Refill: 0    Back pain, unspecified back location, unspecified back pain laterality, unspecified chronicity  -     POCT Urinalysis, Dipstick,  Automated, W/O Scope    Abdominal pain, unspecified abdominal location  -     POCT Urinalysis, Dipstick, Automated, W/O Scope    Urinary tract infection without hematuria, site unspecified  -     nitrofurantoin (MACRODANTIN) 100 MG capsule; Take 1 capsule (100 mg total) by mouth 4 (four) times daily.  Dispense: 28 capsule; Refill: 0

## 2018-05-26 NOTE — PATIENT INSTRUCTIONS
When to Use Antibiotics   Antibiotics are medicines used to treat infections caused by bacteria. They dont work for illnesses caused by viruses or an allergic reaction. In fact, taking antibiotics for reasons other than a bacterial infection can cause problems. For example, you may have side effects from the medicine. And if you really need an antibiotic, it may not work well.                                                                                                                                              When antibiotics wont help  Your healthcare provider wont usually prescribe antibiotics for the following conditions. You can help by not asking for them if you have:   · A cold. This type of illness is caused by a virus. It can cause a runny nose, stuffed-up nose, sneezing, coughing, headache, mild body aches, and low fever. A cold gets better on its own in a few days to a week.  · The flu (influenza). This is a respiratory illness caused by a virus. The flu usually goes away on its own in a week or so. It can cause fever, body aches, sore throat, and fatigue.  · Bronchitis. This is an infection in the lungs most often caused by a virus. You may have coughing, phlegm, body aches, and a low fever. A common type of bronchitis is known as a chest cold (acute bronchitis). This often happens after you have a respiratory infection like a common cold. Bronchitis can take weeks to go away, but antibiotics usually dont help.  · Most sore throats. Sore throats are most often caused by viruses. Your throat may feel scratchy or achy, and it may hurt to swallow. You may also have a low fever and body aches. A sore throat usually gets better in a few days.  · Most ear infections. An ear infection may be caused by a virus or bacteria. It causes pain in the ear. Antibiotics usually dont help, and the infection goes away on its own.  · Most sinus infections (sinusitis). This kind of infection causes sinus pain and  swelling, and a runny nose. In most cases, sinusitis goes away on its own, and antibiotics dont make recovery quicker.  · Allergic rhinitis. This is a set of symptoms caused by an allergic reaction. You may have sneezing, a runny nose, itchy or watery eyes, or a sore throat. Allergies are not treated with antibiotics.  · Low fever. A mild fever thats less than 100.4°F (38°C) most likely doesnt need treatment with antibiotics.   When antibiotics can help   Antibiotics can be used to treat:                                                     · Strep throat. This is a throat infectioncaused by a certain type of bacteria. Symptoms of strep throat include a sore throat, white patches on the tonsils, red spots on the roof of the mouth, fever, body aches, and nausea and vomiting.  · Urinary tract infection (UTI). This is a bacterial infection of the bladder and the tube that takes urine out of the body. It can cause burning pain and urine thats cloudy or tinted with blood. UTIs are very common. Antibiotics usually help treat these infections.  · Some ear infections. In some cases, a healthcare provider may prescribe antibiotics for an ear infection. You may need a test to show whats causing the ear infection.  · Some sinus infections. In some cases, yourhealthcare provider may give you antibiotics. He or she may first need to make sure your symptoms arent caused by a virus, fungus, allergies, or air pollutants such as smoke.   Your doctor may also recommend antibiotics if you have a condition that can affect your immune system, such as diabetes or cancer.   Self-care at home   If your infection cant be treated with antibiotics, you can take other steps to feel better. Try the remedies below. In general:   · Rest and sleep as much as needed.  · Drink water and other clear fluids.  · Dont smoke, and avoid smoke from other people.  · Use over-the-counter medicine such as acetaminophen to ease pain or fever, as  directed by your healthcare provider.   To treat sinus pain or nasal congestion:   · Put a warm, moist washcloth on your face where you feel sinus pain or pressure.  · Use a nasal spray with medicine or saline, as directed by your healthcare provider.  · Breathe in steam from a hot shower.  · Use a humidifier or cool mist vaporizer.   To quiet a cough:   · Use a humidifier or cool mist vaporizer.  · Breathe in steam from a hot shower.  · Use cough lozenges.   To sooth a sore throat:   · Suck on ice chips, popsicles, or lozenges.  · Use a sore throat spray.  · Use a humidifier or cool mist vaporizer.  · Gargle with saltwater.  · Drink warm liquids.   To ease ear pain:   · Hold a warm, moist washcloth on the ear for 10 minutes at a time.  Date Last Reviewed: 9/1/2016  © 4640-5142 [employer]. [address] [place] [address], [place] [address]. All rights reserved. This information is not intended as a substitute for professional medical care. Always follow your healthcare professional's instructions.

## 2018-07-09 ENCOUNTER — OFFICE VISIT (OUTPATIENT)
Dept: OBSTETRICS AND GYNECOLOGY | Facility: CLINIC | Age: 33
End: 2018-07-09
Payer: COMMERCIAL

## 2018-07-09 VITALS
HEART RATE: 72 BPM | BODY MASS INDEX: 43.12 KG/M2 | HEIGHT: 61 IN | DIASTOLIC BLOOD PRESSURE: 82 MMHG | WEIGHT: 228.38 LBS | RESPIRATION RATE: 12 BRPM | SYSTOLIC BLOOD PRESSURE: 120 MMHG

## 2018-07-09 DIAGNOSIS — N92.1 MENOMETRORRHAGIA: ICD-10-CM

## 2018-07-09 DIAGNOSIS — N89.8 VAGINAL DISCHARGE: Primary | ICD-10-CM

## 2018-07-09 DIAGNOSIS — N89.8 VAGINAL ITCHING: ICD-10-CM

## 2018-07-09 DIAGNOSIS — Z30.011 ENCOUNTER FOR INITIAL PRESCRIPTION OF CONTRACEPTIVE PILLS: ICD-10-CM

## 2018-07-09 PROCEDURE — 87480 CANDIDA DNA DIR PROBE: CPT

## 2018-07-09 PROCEDURE — 87510 GARDNER VAG DNA DIR PROBE: CPT

## 2018-07-09 PROCEDURE — 99999 PR PBB SHADOW E&M-EST. PATIENT-LVL III: CPT | Mod: PBBFAC,,, | Performed by: OBSTETRICS & GYNECOLOGY

## 2018-07-09 PROCEDURE — 3008F BODY MASS INDEX DOCD: CPT | Mod: CPTII,S$GLB,, | Performed by: OBSTETRICS & GYNECOLOGY

## 2018-07-09 PROCEDURE — 99213 OFFICE O/P EST LOW 20 MIN: CPT | Mod: S$GLB,,, | Performed by: OBSTETRICS & GYNECOLOGY

## 2018-07-09 RX ORDER — LEVONORGESTREL AND ETHINYL ESTRADIOL 0.1-0.02MG
1 KIT ORAL DAILY
Qty: 28 TABLET | Refills: 12 | Status: SHIPPED | OUTPATIENT
Start: 2018-07-09 | End: 2019-05-02

## 2018-07-09 NOTE — PROGRESS NOTES
Subjective:    Patient ID: Lynn Green is a 32 y.o. y.o. female.     Chief Complaint:   Chief Complaint   Patient presents with    Pelvic Pain    Vaginal Discharge    Vaginal Itching     vaginal odor       History of Present Illness   Lynn presents today complaining of discharge and odor. Symptoms began about 2  weeks ago and have been persistent.  Patient's last menstrual period was 06/11/2018.. She also reports pelvic pain.  Pain is mosting in the suprapugbic region and is described as sharp. She deines urinary symptoms.  She has h/o irregular BM but has been taking fiber and has been more regular recently. She is sexually active.  She is interested in restarting OCPs to regulate menses, pregnancy prevention, and hopeful improvement in pelvic pain.    ROS:   CONSTITUTIONAL: Negative for fever, chills, diaphoresis, weakness, fatigue, weight loss, weight gain  GASTROINTESTINAL: negative for abdominal pain, flank pain, nausea, vomiting, diarrhea, constipation, black stool, blood in stool  BREAST: negative for breast  tenderness, breast mass, nipple discharge, or skin changes  GENITOURINARY: genital discharge, irregular menses, heavy menses, pelvic pain, Denies: dysuria, frequency/urgency, hematuria  HEMATOLOGIC/LYMPHATIC: negative for swollen lymph nodes, bleeding, bruising  MUSCULOSKELETAL: negative for back pain, joint pain, joint stiffness, joint swelling, muscle pain, muscle weakness  ENDOCRINE: negative for polydipsia/polyuria, palpitations, skin changes, temperature intolerance, unexpected weight changes      Objective:    Vital Signs:  Vitals:    07/09/18 0806   BP: 120/82   Pulse: 72   Resp: 12       Physical Exam:  General:  alert, cooperative, no distress   Neuro/Psych: AAOx3, appropriate mood and affect   Head: Normocephalic, atraumatic   Neck: Supple, normal ROM   Resp: Normal effort   Skin:  Skin color, texture, turgor normal. No rashes or lesions   Abdomen:  soft, NT   Pelvis: External  genitalia: normal general appearance  Urinary system: urethral meatus normal, bladder nontender  Vaginal: normal mucosa without prolapse or lesions, discharge, white  Cervix: normal appearance  Uterus: nontender, normal size, shape, position  Adnexa: normal size, nontender bilaterally         Assessment:      1. Vaginal discharge    2. Vaginal itching    3. Menometrorrhagia    4. Encounter for initial prescription of contraceptive pills          Plan:      Vaginal discharge  -     Vaginosis Screen by DNA Probe    Vaginal itching  -     Vaginosis Screen by DNA Probe    Menometrorrhagia  -     levonorgestrel-ethinyl estradiol (AVIANE,ALESSE,LESSINA) 0.1-20 mg-mcg per tablet; Take 1 tablet by mouth once daily.  Dispense: 28 tablet; Refill: 12    Encounter for initial prescription of contraceptive pills  -     levonorgestrel-ethinyl estradiol (AVIANE,ALESSE,LESSINA) 0.1-20 mg-mcg per tablet; Take 1 tablet by mouth once daily.  Dispense: 28 tablet; Refill: 12

## 2019-04-05 ENCOUNTER — TELEPHONE (OUTPATIENT)
Dept: MATERNAL FETAL MEDICINE | Facility: CLINIC | Age: 34
End: 2019-04-05

## 2019-04-05 DIAGNOSIS — O09.512 ELDERLY PRIMIGRAVIDA IN SECOND TRIMESTER: ICD-10-CM

## 2019-04-05 DIAGNOSIS — Z36.82 ENCOUNTER FOR NUCHAL TRANSLUCENCY TESTING: Primary | ICD-10-CM

## 2019-04-05 NOTE — TELEPHONE ENCOUNTER
After receiving faxed referral from Dr. Sandra for a [facility] consultation and ultrasound due to AMA status, nurse phoned patient's contact number.    Patient scheduled for Thursday, May 2nd at the [facility] location with Dr. Dugan.    Patient verbalized understanding of appointment date, time and location.

## 2019-05-02 ENCOUNTER — PROCEDURE VISIT (OUTPATIENT)
Dept: MATERNAL FETAL MEDICINE | Facility: HOSPITAL | Age: 34
End: 2019-05-02
Payer: COMMERCIAL

## 2019-05-02 VITALS — DIASTOLIC BLOOD PRESSURE: 66 MMHG | SYSTOLIC BLOOD PRESSURE: 122 MMHG | WEIGHT: 219 LBS | BODY MASS INDEX: 41.38 KG/M2

## 2019-05-02 DIAGNOSIS — Z3A.13 13 WEEKS GESTATION OF PREGNANCY: ICD-10-CM

## 2019-05-02 DIAGNOSIS — O09.511 ELDERLY PRIMIGRAVIDA IN FIRST TRIMESTER: ICD-10-CM

## 2019-05-02 DIAGNOSIS — O09.512 ELDERLY PRIMIGRAVIDA IN SECOND TRIMESTER: ICD-10-CM

## 2019-05-02 DIAGNOSIS — Z36.82 ENCOUNTER FOR NUCHAL TRANSLUCENCY TESTING: ICD-10-CM

## 2019-05-02 PROCEDURE — 99213 OFFICE O/P EST LOW 20 MIN: CPT | Mod: 25,,, | Performed by: OBSTETRICS & GYNECOLOGY

## 2019-05-02 PROCEDURE — 76813 OB US NUCHAL MEAS 1 GEST: CPT | Mod: 26,,, | Performed by: OBSTETRICS & GYNECOLOGY

## 2019-05-02 PROCEDURE — 76813 OB US NUCHAL MEAS 1 GEST: CPT

## 2019-05-02 PROCEDURE — 76813 PR US, OB NUCHAL, TRANSABDOM/TRANSVAG, FIRST GESTATION: ICD-10-PCS | Mod: 26,,, | Performed by: OBSTETRICS & GYNECOLOGY

## 2019-05-02 PROCEDURE — 99213 PR OFFICE/OUTPT VISIT, EST, LEVL III, 20-29 MIN: ICD-10-PCS | Mod: 25,,, | Performed by: OBSTETRICS & GYNECOLOGY

## 2019-05-14 ENCOUNTER — TELEPHONE (OUTPATIENT)
Dept: MATERNAL FETAL MEDICINE | Facility: CLINIC | Age: 34
End: 2019-05-14

## 2019-06-20 ENCOUNTER — PROCEDURE VISIT (OUTPATIENT)
Dept: MATERNAL FETAL MEDICINE | Facility: HOSPITAL | Age: 34
End: 2019-06-20
Attending: OBSTETRICS & GYNECOLOGY
Payer: COMMERCIAL

## 2019-06-20 VITALS — DIASTOLIC BLOOD PRESSURE: 78 MMHG | SYSTOLIC BLOOD PRESSURE: 132 MMHG | WEIGHT: 219 LBS | BODY MASS INDEX: 41.38 KG/M2

## 2019-06-20 DIAGNOSIS — Z36.4 ANTENATAL SCREENING FOR FETAL GROWTH RETARDATION USING ULTRASONICS: ICD-10-CM

## 2019-06-20 DIAGNOSIS — O44.42 LOW-LYING PLACENTA IN SECOND TRIMESTER: ICD-10-CM

## 2019-06-20 DIAGNOSIS — O09.512 ELDERLY PRIMIGRAVIDA IN SECOND TRIMESTER: ICD-10-CM

## 2019-06-20 DIAGNOSIS — O99.210 OBESITY IN PREGNANCY: ICD-10-CM

## 2019-06-20 DIAGNOSIS — Z36.3 ANTENATAL SCREENING FOR MALFORMATION USING ULTRASONICS: ICD-10-CM

## 2019-06-20 PROCEDURE — 76811 PR US, OB FETAL EVAL & EXAM, TRANSABDOM,FIRST GESTATION: ICD-10-PCS | Mod: 26,,, | Performed by: OBSTETRICS & GYNECOLOGY

## 2019-06-20 PROCEDURE — 99213 OFFICE O/P EST LOW 20 MIN: CPT | Mod: 25,,, | Performed by: OBSTETRICS & GYNECOLOGY

## 2019-06-20 PROCEDURE — 76811 OB US DETAILED SNGL FETUS: CPT

## 2019-06-20 PROCEDURE — 76811 OB US DETAILED SNGL FETUS: CPT | Mod: 26,,, | Performed by: OBSTETRICS & GYNECOLOGY

## 2019-06-20 PROCEDURE — 99213 PR OFFICE/OUTPT VISIT, EST, LEVL III, 20-29 MIN: ICD-10-PCS | Mod: 25,,, | Performed by: OBSTETRICS & GYNECOLOGY

## 2019-07-25 ENCOUNTER — PROCEDURE VISIT (OUTPATIENT)
Dept: MATERNAL FETAL MEDICINE | Facility: HOSPITAL | Age: 34
End: 2019-07-25
Attending: OBSTETRICS & GYNECOLOGY
Payer: COMMERCIAL

## 2019-07-25 DIAGNOSIS — Z36.4 ANTENATAL SCREENING FOR FETAL GROWTH RETARDATION USING ULTRASONICS: ICD-10-CM

## 2019-07-25 DIAGNOSIS — Z36.3 ANTENATAL SCREENING FOR MALFORMATION USING ULTRASONICS: ICD-10-CM

## 2019-07-25 DIAGNOSIS — O99.891 CURRENT MATERNAL CONDITION AFFECTING PREGNANCY: ICD-10-CM

## 2019-07-25 DIAGNOSIS — Z3A.25 25 WEEKS GESTATION OF PREGNANCY: ICD-10-CM

## 2019-07-25 DIAGNOSIS — O09.512 ELDERLY PRIMIGRAVIDA IN SECOND TRIMESTER: ICD-10-CM

## 2019-07-25 PROCEDURE — 76816 OB US FOLLOW-UP PER FETUS: CPT | Mod: 26,,, | Performed by: OBSTETRICS & GYNECOLOGY

## 2019-07-25 PROCEDURE — 76816 PR  US,PREGNANT UTERUS,F/U,TRANSABD APP: ICD-10-PCS | Mod: 26,,, | Performed by: OBSTETRICS & GYNECOLOGY

## 2019-07-25 PROCEDURE — 99499 NO LOS: ICD-10-PCS | Mod: ,,, | Performed by: OBSTETRICS & GYNECOLOGY

## 2019-07-25 PROCEDURE — 76816 OB US FOLLOW-UP PER FETUS: CPT

## 2019-07-25 PROCEDURE — 99499 UNLISTED E&M SERVICE: CPT | Mod: ,,, | Performed by: OBSTETRICS & GYNECOLOGY

## 2019-07-25 NOTE — PROGRESS NOTES
After pt's ultrasound in [facility] - orders placed per Dr. Dugan for 32 wk placental location and growth due to AMA and elevated BMI 40, order for fetal echo placed and messaged peds cardiology to please call patient to schedule due to suboptimal heart views.

## 2019-07-26 ENCOUNTER — TELEPHONE (OUTPATIENT)
Dept: PEDIATRIC CARDIOLOGY | Facility: CLINIC | Age: 34
End: 2019-07-26

## 2019-08-06 ENCOUNTER — CLINICAL SUPPORT (OUTPATIENT)
Dept: PEDIATRIC CARDIOLOGY | Facility: CLINIC | Age: 34
End: 2019-08-06
Attending: SPECIALIST
Payer: COMMERCIAL

## 2019-08-06 ENCOUNTER — OFFICE VISIT (OUTPATIENT)
Dept: PEDIATRIC CARDIOLOGY | Facility: CLINIC | Age: 34
End: 2019-08-06
Attending: PEDIATRICS
Payer: COMMERCIAL

## 2019-08-06 VITALS
HEART RATE: 108 BPM | SYSTOLIC BLOOD PRESSURE: 129 MMHG | DIASTOLIC BLOOD PRESSURE: 76 MMHG | HEIGHT: 61 IN | BODY MASS INDEX: 41.62 KG/M2 | WEIGHT: 220.44 LBS

## 2019-08-06 DIAGNOSIS — O09.512 ELDERLY PRIMIGRAVIDA IN SECOND TRIMESTER: ICD-10-CM

## 2019-08-06 DIAGNOSIS — Z3A.26 26 WEEKS GESTATION OF PREGNANCY: Primary | ICD-10-CM

## 2019-08-06 PROCEDURE — 76825 ECHO EXAM OF FETAL HEART: CPT | Mod: S$GLB,,, | Performed by: PEDIATRICS

## 2019-08-06 PROCEDURE — 99999 PR PBB SHADOW E&M-EST. PATIENT-LVL I: ICD-10-PCS | Mod: PBBFAC,,,

## 2019-08-06 PROCEDURE — 76827 ECHO EXAM OF FETAL HEART: CPT | Mod: S$GLB,,, | Performed by: PEDIATRICS

## 2019-08-06 PROCEDURE — 76825 PR  SO2 FETAL HEART: ICD-10-PCS | Mod: S$GLB,,, | Performed by: PEDIATRICS

## 2019-08-06 PROCEDURE — 3008F BODY MASS INDEX DOCD: CPT | Mod: CPTII,S$GLB,, | Performed by: PEDIATRICS

## 2019-08-06 PROCEDURE — 76827 PR  SO2 FETAL HEART DOPPLER: ICD-10-PCS | Mod: S$GLB,,, | Performed by: PEDIATRICS

## 2019-08-06 PROCEDURE — 99999 PR PBB SHADOW E&M-EST. PATIENT-LVL I: CPT | Mod: PBBFAC,,,

## 2019-08-06 PROCEDURE — 93325 PR DOPPLER COLOR FLOW VELOCITY MAP: ICD-10-PCS | Mod: S$GLB,,, | Performed by: PEDIATRICS

## 2019-08-06 PROCEDURE — 99203 PR OFFICE/OUTPT VISIT, NEW, LEVL III, 30-44 MIN: ICD-10-PCS | Mod: 25,S$GLB,, | Performed by: PEDIATRICS

## 2019-08-06 PROCEDURE — 99203 OFFICE O/P NEW LOW 30 MIN: CPT | Mod: 25,S$GLB,, | Performed by: PEDIATRICS

## 2019-08-06 PROCEDURE — 99999 PR PBB SHADOW E&M-EST. PATIENT-LVL III: CPT | Mod: PBBFAC,,, | Performed by: PEDIATRICS

## 2019-08-06 PROCEDURE — 99999 PR PBB SHADOW E&M-EST. PATIENT-LVL III: ICD-10-PCS | Mod: PBBFAC,,, | Performed by: PEDIATRICS

## 2019-08-06 PROCEDURE — 93325 DOPPLER ECHO COLOR FLOW MAPG: CPT | Mod: S$GLB,,, | Performed by: PEDIATRICS

## 2019-08-06 PROCEDURE — 3008F PR BODY MASS INDEX (BMI) DOCUMENTED: ICD-10-PCS | Mod: CPTII,S$GLB,, | Performed by: PEDIATRICS

## 2019-08-06 RX ORDER — ASPIRIN 81 MG/1
81 TABLET ORAL DAILY
COMMUNITY
End: 2021-01-25

## 2019-08-06 NOTE — PROGRESS NOTES
I had the pleasure of evaluating Lynn Green who is now 33 y.o.  and carrying her 1st pregnancy at 26 6/7 weeks gestation. She was referred for evaluation of the fetal heart due to increased risks for congenital heart disease associated with advanced maternal age and difficulty demonstrating fetal cardiac anatomy at [facility] evaluations. Of note, Maternal Cell Free DNA test was performed and is reported as unremarkable.      Current Outpatient Medications:     aspirin (ECOTRIN) 81 MG EC tablet, Take 81 mg by mouth once daily., Disp: , Rfl:     prenatal 25/iron fum/folic/dha (PRENATAL-1 ORAL), Take by mouth., Disp: , Rfl:   Review of patient's allergies indicates:   Allergen Reactions    Sulfamethoxazole-trimethoprim Hives     Hives (skin)^  Hives (skin)^  Hives (skin)^         Maternal factors increasing risk for congenital heart disease:  Unremarkable  Paternal factors increasing risk for congenital heart disease:  Unremarkable    FETAL ECHOCARDIOGRAM (preliminary):  Normal fetal cardiac connections and function for estimated gestational age.  (Full report in electronic medical record)    I reviewed the strengths and weaknesses of fetal echocardiography including the insufficient sensitivity to rule out many septal defects, anomalies of pulmonary and systemic veins, arch anomalies, and some valvar abnormalities. I also discussed that  resolution of the ductus arteriosus and foramen ovale (normal fetal structures) cannot be assured by fetal evaluation. Finally, I reviewed today's echocardiogram that demonstrates normal anatomical connections and function for the estimated gestational age. Within the limitations imposed by fetal physiology, I would expect a high probability that this infant will be born with a normal heart.    I reviewed the concerns related to advanced maternal age and in particular the risk for Down syndrome which is frequently associated with congenital heart disease. Ms. Green elected  to perform maternal cell free DNA testing which demonstrated no evidence for trisomy 21,18 or 13. This test has a high predictive value and is very reassuring. In addition, the fetal cardiac evaluation also does not demonstrate any evidence for congenital heart disease which is highly associated with each of these genetic abnormalities in some form.      I answered all the family's questions. I also provided an information sheet reviewing the fetal physiology and how this compares to normal  physiology. This  also includes a reiteration of the limitations of fetal echocardiography that I have discussed today. I have not scheduled another evaluation; however, if questions arise later during gestation or after delivery, I would be happy to assist in any way. Ms. Green is comfortable with the plan.    RECOMMENDATIONS:  Evaluation of the infant after delivery if the clinical exam is abnormal        Note:  Over 50% of visit in consultation with the family >30 minutes

## 2019-08-06 NOTE — LETTER
August 6, 2019        Hollie Sandra MD  [address] [facility] [place] [address]             [facility]  [address], [address]  [facility] [address]  Phone: [phone]  Fax: [phone]   Patient: Lynn Green   MR Number: [id number]   YOB: 1985   Date of Visit: 8/6/2019       Dear Dr. Sandra:    Thank you for referring Lynn Green to me for evaluation. Attached you will find relevant portions of my assessment and plan of care.    If you have questions, please do not hesitate to call me. I look forward to following Lynn Green along with you.    Sincerely,      Kenneth Hernandez MD            CC  MD Vida Loweosure

## 2019-08-15 ENCOUNTER — TELEPHONE (OUTPATIENT)
Dept: INFUSION THERAPY | Facility: HOSPITAL | Age: 34
End: 2019-08-15

## 2019-09-09 ENCOUNTER — HOSPITAL ENCOUNTER (OUTPATIENT)
Facility: HOSPITAL | Age: 34
Discharge: HOME | End: 2019-09-09
Attending: SPECIALIST | Admitting: SPECIALIST
Payer: COMMERCIAL

## 2019-09-09 VITALS
OXYGEN SATURATION: 100 % | BODY MASS INDEX: 41.54 KG/M2 | HEIGHT: 61 IN | TEMPERATURE: 98 F | HEART RATE: 82 BPM | RESPIRATION RATE: 16 BRPM | WEIGHT: 220 LBS | DIASTOLIC BLOOD PRESSURE: 74 MMHG | SYSTOLIC BLOOD PRESSURE: 128 MMHG

## 2019-09-09 PROCEDURE — 25000003 PHARM REV CODE 250: Performed by: SPECIALIST

## 2019-09-09 PROCEDURE — 99211 OFF/OP EST MAY X REQ PHY/QHP: CPT

## 2019-09-09 RX ORDER — ACETAMINOPHEN 500 MG
1000 TABLET ORAL ONCE
Status: COMPLETED | OUTPATIENT
Start: 2019-09-09 | End: 2019-09-09

## 2019-09-09 RX ADMIN — ACETAMINOPHEN 1000 MG: 500 TABLET ORAL at 01:09

## 2019-09-09 NOTE — NURSING
Dr ortega notifies of pt arrival and status. Will po  Hydrate, give tylenol for pain; d/c when pain is gone and baby is reactive.

## 2019-09-09 NOTE — NURSING
Pt reports feeling better, no abd pain and baby is moving. Will d/c home on labor prec. Verbal and printed instructions given. F/u with u/s appt on Thursday.

## 2019-09-09 NOTE — NURSING
32 y/o  at 31.5 weeks, here for c/o abdominal pain and pressure.pt reports she had these pains yesterday, drank water and took tylenol and they went away. Started having the pains again tonight at about 9 or 10pm. Pains are more like pressure than cramping, about every 15-20 minutes. No rom, no vag bleed, no dysuria. No movement at present; pt has not eaten for several hours and only  Drank 4 bottles of water today. Denies sex. Given some apple juice to stimulate fetal reactivity and water for hydration. sve- cervix closed, posterior

## 2019-09-12 ENCOUNTER — PROCEDURE VISIT (OUTPATIENT)
Dept: MATERNAL FETAL MEDICINE | Facility: HOSPITAL | Age: 34
End: 2019-09-12
Attending: SPECIALIST
Payer: COMMERCIAL

## 2019-09-12 VITALS — WEIGHT: 223 LBS | DIASTOLIC BLOOD PRESSURE: 70 MMHG | BODY MASS INDEX: 42.14 KG/M2 | SYSTOLIC BLOOD PRESSURE: 100 MMHG

## 2019-09-12 DIAGNOSIS — O99.891 CURRENT MATERNAL CONDITION AFFECTING PREGNANCY: ICD-10-CM

## 2019-09-12 DIAGNOSIS — O35.9XX0 SUSPECTED FETAL ABNORMALITY AFFECTING MANAGEMENT OF MOTHER, SINGLE OR UNSPECIFIED FETUS: ICD-10-CM

## 2019-09-12 DIAGNOSIS — Z3A.32 32 WEEKS GESTATION OF PREGNANCY: ICD-10-CM

## 2019-09-12 DIAGNOSIS — O09.512 ELDERLY PRIMIGRAVIDA IN SECOND TRIMESTER: ICD-10-CM

## 2019-09-12 DIAGNOSIS — Z36.89 ENCOUNTER FOR ULTRASOUND TO CHECK FETAL GROWTH: Primary | ICD-10-CM

## 2019-09-12 DIAGNOSIS — Z36.3 ANTENATAL SCREENING FOR MALFORMATION USING ULTRASONICS: ICD-10-CM

## 2019-09-12 PROCEDURE — 76816 OB US FOLLOW-UP PER FETUS: CPT

## 2019-09-12 PROCEDURE — 76816 PR  US,PREGNANT UTERUS,F/U,TRANSABD APP: ICD-10-PCS | Mod: 26,,, | Performed by: OBSTETRICS & GYNECOLOGY

## 2019-09-12 PROCEDURE — 99213 OFFICE O/P EST LOW 20 MIN: CPT | Mod: 25,,, | Performed by: OBSTETRICS & GYNECOLOGY

## 2019-09-12 PROCEDURE — 76816 OB US FOLLOW-UP PER FETUS: CPT | Mod: 26,,, | Performed by: OBSTETRICS & GYNECOLOGY

## 2019-09-12 PROCEDURE — 99213 PR OFFICE/OUTPT VISIT, EST, LEVL III, 20-29 MIN: ICD-10-PCS | Mod: 25,,, | Performed by: OBSTETRICS & GYNECOLOGY

## 2019-09-12 PROCEDURE — 76817 TRANSVAGINAL US OBSTETRIC: CPT

## 2019-09-28 ENCOUNTER — OFFICE VISIT (OUTPATIENT)
Dept: URGENT CARE | Facility: CLINIC | Age: 34
End: 2019-09-28
Payer: COMMERCIAL

## 2019-09-28 VITALS
OXYGEN SATURATION: 99 % | DIASTOLIC BLOOD PRESSURE: 73 MMHG | WEIGHT: 223 LBS | TEMPERATURE: 99 F | HEIGHT: 61 IN | SYSTOLIC BLOOD PRESSURE: 126 MMHG | BODY MASS INDEX: 42.1 KG/M2 | RESPIRATION RATE: 18 BRPM | HEART RATE: 97 BPM

## 2019-09-28 DIAGNOSIS — O23.43 URINARY TRACT INFECTION IN MOTHER DURING THIRD TRIMESTER OF PREGNANCY: ICD-10-CM

## 2019-09-28 DIAGNOSIS — R35.0 FREQUENCY OF URINATION: Primary | ICD-10-CM

## 2019-09-28 LAB
BILIRUB UR QL STRIP: NEGATIVE
GLUCOSE UR QL STRIP: NEGATIVE
KETONES UR QL STRIP: NEGATIVE
LEUKOCYTE ESTERASE UR QL STRIP: POSITIVE
PH, POC UA: 6 (ref 5–8)
POC BLOOD, URINE: POSITIVE
POC NITRATES, URINE: NEGATIVE
PROT UR QL STRIP: NEGATIVE
SP GR UR STRIP: 1.02 (ref 1–1.03)
UROBILINOGEN UR STRIP-ACNC: NORMAL (ref 0.1–1.1)

## 2019-09-28 PROCEDURE — 99203 OFFICE O/P NEW LOW 30 MIN: CPT | Mod: S$GLB,,, | Performed by: NURSE PRACTITIONER

## 2019-09-28 PROCEDURE — 3008F BODY MASS INDEX DOCD: CPT | Mod: CPTII,S$GLB,, | Performed by: NURSE PRACTITIONER

## 2019-09-28 PROCEDURE — 81003 POCT URINALYSIS, DIPSTICK, AUTOMATED, W/O SCOPE: ICD-10-PCS | Mod: QW,S$GLB,, | Performed by: NURSE PRACTITIONER

## 2019-09-28 PROCEDURE — 3008F PR BODY MASS INDEX (BMI) DOCUMENTED: ICD-10-PCS | Mod: CPTII,S$GLB,, | Performed by: NURSE PRACTITIONER

## 2019-09-28 PROCEDURE — 87077 CULTURE AEROBIC IDENTIFY: CPT

## 2019-09-28 PROCEDURE — 99203 PR OFFICE/OUTPT VISIT, NEW, LEVL III, 30-44 MIN: ICD-10-PCS | Mod: S$GLB,,, | Performed by: NURSE PRACTITIONER

## 2019-09-28 PROCEDURE — 87088 URINE BACTERIA CULTURE: CPT

## 2019-09-28 PROCEDURE — 81003 URINALYSIS AUTO W/O SCOPE: CPT | Mod: QW,S$GLB,, | Performed by: NURSE PRACTITIONER

## 2019-09-28 PROCEDURE — 87086 URINE CULTURE/COLONY COUNT: CPT

## 2019-09-28 PROCEDURE — 87186 SC STD MICRODIL/AGAR DIL: CPT

## 2019-09-28 RX ORDER — CEPHALEXIN 500 MG/1
500 CAPSULE ORAL EVERY 6 HOURS
Qty: 40 CAPSULE | Refills: 0 | Status: SHIPPED | OUTPATIENT
Start: 2019-09-28 | End: 2019-10-08

## 2019-09-28 NOTE — PROGRESS NOTES
"Subjective:       Patient ID: Lynn Green is a 33 y.o. female.    Vitals:  height is 5' 1" (1.549 m) and weight is 101.2 kg (223 lb). Her temperature is 98.9 °F (37.2 °C). Her blood pressure is 126/73 and her pulse is 97. Her respiration is 18 and oxygen saturation is 99%.     Chief Complaint: Urinary Tract Infection    Urinary Tract Infection    This is a new problem. The current episode started today. The problem occurs every urination. The problem has been unchanged. The pain is at a severity of 8/10. The pain is severe. There has been no fever. She is not sexually active. Associated symptoms include frequency and hematuria. Pertinent negatives include no chills, nausea, urgency, vomiting or rash. She has tried nothing for the symptoms.   34 weeks pregnant, was in hospital last week due to lack of movement of baby under obs, denies having catheter    Constitution: Negative for chills and fever.   Neck: Negative for painful lymph nodes.   Gastrointestinal: Negative for abdominal pain, nausea and vomiting.   Genitourinary: Positive for frequency and hematuria. Negative for dysuria, urgency, urine decreased, history of kidney stones, painful menstruation, irregular menstruation, missed menses, heavy menstrual bleeding, ovarian cysts, genital trauma, vaginal pain, vaginal discharge, vaginal bleeding, vaginal odor, painful intercourse, genital sore, painful ejaculation and pelvic pain.   Musculoskeletal: Negative for back pain.   Skin: Negative for rash and lesion.   Hematologic/Lymphatic: Negative for swollen lymph nodes.       Objective:      Physical Exam   Constitutional: She is oriented to person, place, and time. She appears well-developed and well-nourished. No distress.   HENT:   Head: Normocephalic.   Eyes: Pupils are equal, round, and reactive to light.   Cardiovascular: Normal rate, regular rhythm and normal heart sounds.   Pulmonary/Chest: Effort normal and breath sounds normal. No respiratory " distress. She has no wheezes. She has no rales.   Abdominal: Soft. She exhibits no mass. There is no rebound and no guarding.   Good maternal movement   Neurological: She is alert and oriented to person, place, and time.   Skin: Skin is warm and dry.   Psychiatric: She has a normal mood and affect. Her behavior is normal. Judgment and thought content normal.   Nursing note and vitals reviewed.      Assessment:        UTI   Plan:       Results for orders placed or performed in visit on 09/28/19   POCT Urinalysis, Dipstick, Automated, W/O Scope   Result Value Ref Range    POC Blood, Urine Positive (A) Negative    POC Bilirubin, Urine Negative Negative    POC Urobilinogen, Urine Normal 0.1 - 1.1    POC Ketones, Urine Negative Negative    POC Protein, Urine Negative Negative    POC Nitrates, Urine Negative Negative    POC Glucose, Urine Negative Negative    pH, UA 6.0 5 - 8    POC Specific Gravity, Urine 1.020 1.003 - 1.029    POC Leukocytes, Urine Positive (A) Negative     Lynn was seen today for urinary tract infection.    Diagnoses and all orders for this visit:    Frequency of urination  -     POCT Urinalysis, Dipstick, Automated, W/O Scope  -     Urine culture    Other orders  -     cephALEXin (KEFLEX) 500 MG capsule; Take 1 capsule (500 mg total) by mouth every 6 (six) hours. for 10 days        Education  Pt has been given instructions populated from [employer] database and those entered into patient instructions field and has verbalized understanding of the after visit summary and information contained wherein.    Follow Up  If no better 3-5 days fu with ob-gyn.    In Case of Emergency   May go to ER for acute shortness of breath, lightheadedness, fever, or any other emergent complaints or changes in condition.

## 2019-09-28 NOTE — PATIENT INSTRUCTIONS
Anatomy of the Female Urinary Tract  Your urinary tract helps get rid of urine (your bodys liquid waste). The kidneys collect chemicals and water your body doesnt need. This is turned into urine. Urine travels out of the kidneys through the ureters to the bladder. The bladder holds urine until youre ready to release it. The urethra carries urine from the bladder out of the body. The main sphincter muscle circles the mid-urethra.      Front view of female urinary tract.     Date Last Reviewed: 1/1/2017 © 2000-2017 [employer]. [address] [place] [address]. All rights reserved. This information is not intended as a substitute for professional medical care. Always follow your healthcare professional's instructions.

## 2019-10-01 LAB — BACTERIA UR CULT: ABNORMAL

## 2019-10-02 ENCOUNTER — TELEPHONE (OUTPATIENT)
Dept: URGENT CARE | Facility: CLINIC | Age: 34
End: 2019-10-02

## 2019-10-02 NOTE — TELEPHONE ENCOUNTER
----- Message from Arpita Sorensen PA-C sent at 10/2/2019  8:54 AM CDT -----  Please call the patient regarding her abnormal result.  Her urine did grow bacteria.  She does have a UTI.  She was treated appropriately with antibiotics.  Please continue these to completion.  Please follow up with primary care if she is still having symptoms

## 2019-10-10 ENCOUNTER — PROCEDURE VISIT (OUTPATIENT)
Dept: MATERNAL FETAL MEDICINE | Facility: HOSPITAL | Age: 34
End: 2019-10-10
Attending: OBSTETRICS & GYNECOLOGY
Payer: COMMERCIAL

## 2019-10-10 VITALS — BODY MASS INDEX: 40.81 KG/M2 | SYSTOLIC BLOOD PRESSURE: 118 MMHG | WEIGHT: 216 LBS | DIASTOLIC BLOOD PRESSURE: 78 MMHG

## 2019-10-10 DIAGNOSIS — Z36.3 ANTENATAL SCREENING FOR MALFORMATION USING ULTRASONICS: ICD-10-CM

## 2019-10-10 DIAGNOSIS — O98.113 SYPHILIS COMPLICATING PREGNANCY IN THIRD TRIMESTER: ICD-10-CM

## 2019-10-10 DIAGNOSIS — Z36.89 ENCOUNTER FOR ULTRASOUND TO CHECK FETAL GROWTH: ICD-10-CM

## 2019-10-10 DIAGNOSIS — O09.523 MULTIGRAVIDA OF ADVANCED MATERNAL AGE IN THIRD TRIMESTER: ICD-10-CM

## 2019-10-10 DIAGNOSIS — O35.9XX0 SUSPECTED FETAL ABNORMALITY AFFECTING MANAGEMENT OF MOTHER, SINGLE OR UNSPECIFIED FETUS: ICD-10-CM

## 2019-10-10 DIAGNOSIS — Z3A.36 36 WEEKS GESTATION OF PREGNANCY: ICD-10-CM

## 2019-10-10 PROCEDURE — 99213 PR OFFICE/OUTPT VISIT, EST, LEVL III, 20-29 MIN: ICD-10-PCS | Mod: 25,,, | Performed by: OBSTETRICS & GYNECOLOGY

## 2019-10-10 PROCEDURE — 99213 OFFICE O/P EST LOW 20 MIN: CPT | Mod: 25,,, | Performed by: OBSTETRICS & GYNECOLOGY

## 2019-10-10 PROCEDURE — 76816 PR  US,PREGNANT UTERUS,F/U,TRANSABD APP: ICD-10-PCS | Mod: 26,,, | Performed by: OBSTETRICS & GYNECOLOGY

## 2019-10-10 PROCEDURE — 76816 OB US FOLLOW-UP PER FETUS: CPT | Mod: 26,,, | Performed by: OBSTETRICS & GYNECOLOGY

## 2019-10-10 PROCEDURE — 76816 OB US FOLLOW-UP PER FETUS: CPT

## 2019-10-17 ENCOUNTER — HOSPITAL ENCOUNTER (INPATIENT)
Facility: HOSPITAL | Age: 34
LOS: 4 days | Discharge: HOME | End: 2019-10-21
Attending: SPECIALIST | Admitting: SPECIALIST
Payer: COMMERCIAL

## 2019-10-17 ENCOUNTER — ANESTHESIA EVENT (OUTPATIENT)
Dept: OBSTETRICS AND GYNECOLOGY | Facility: HOSPITAL | Age: 34
End: 2019-10-17
Payer: COMMERCIAL

## 2019-10-17 ENCOUNTER — ANESTHESIA (OUTPATIENT)
Dept: OBSTETRICS AND GYNECOLOGY | Facility: HOSPITAL | Age: 34
End: 2019-10-17
Payer: COMMERCIAL

## 2019-10-17 DIAGNOSIS — Z34.90 INTRAUTERINE PREGNANCY: ICD-10-CM

## 2019-10-17 LAB
ABO + RH BLD: NORMAL
ALBUMIN SERPL BCP-MCNC: 2.8 G/DL (ref 3.5–5.2)
ALP SERPL-CCNC: 196 U/L (ref 55–135)
ALT SERPL W/O P-5'-P-CCNC: 10 U/L (ref 10–44)
ANION GAP SERPL CALC-SCNC: 10 MMOL/L (ref 8–16)
APTT BLDCRRT: 33 SEC (ref 21–32)
AST SERPL-CCNC: 14 U/L (ref 10–40)
BASOPHILS # BLD AUTO: 0.02 K/UL (ref 0–0.2)
BASOPHILS NFR BLD: 0.3 % (ref 0–1.9)
BILIRUB SERPL-MCNC: 0.2 MG/DL (ref 0.1–1)
BLD GP AB SCN CELLS X3 SERPL QL: NORMAL
BLOOD GROUP ANTIBODIES SERPL: NORMAL
BUN SERPL-MCNC: 4 MG/DL (ref 6–20)
CALCIUM SERPL-MCNC: 9.6 MG/DL (ref 8.7–10.5)
CHLORIDE SERPL-SCNC: 105 MMOL/L (ref 95–110)
CO2 SERPL-SCNC: 22 MMOL/L (ref 23–29)
CREAT SERPL-MCNC: 0.5 MG/DL (ref 0.5–1.4)
DAT IGG-SP REAG RBC-IMP: NORMAL
DIFFERENTIAL METHOD: ABNORMAL
EOSINOPHIL # BLD AUTO: 0.1 K/UL (ref 0–0.5)
EOSINOPHIL NFR BLD: 0.9 % (ref 0–8)
ERYTHROCYTE [DISTWIDTH] IN BLOOD BY AUTOMATED COUNT: 15.6 % (ref 11.5–14.5)
EST. GFR  (AFRICAN AMERICAN): >60 ML/MIN/1.73 M^2
EST. GFR  (NON AFRICAN AMERICAN): >60 ML/MIN/1.73 M^2
FIBRINOGEN PPP-MCNC: 439 MG/DL (ref 182–366)
GLUCOSE SERPL-MCNC: 68 MG/DL (ref 70–110)
HCT VFR BLD AUTO: 35.8 % (ref 37–48.5)
HGB BLD-MCNC: 11.9 G/DL (ref 12–16)
INR PPP: 1 (ref 0.8–1.2)
LYMPHOCYTES # BLD AUTO: 2.1 K/UL (ref 1–4.8)
LYMPHOCYTES NFR BLD: 29.9 % (ref 18–48)
MCH RBC QN AUTO: 28.3 PG (ref 27–31)
MCHC RBC AUTO-ENTMCNC: 33.2 G/DL (ref 32–36)
MCV RBC AUTO: 85 FL (ref 82–98)
MONOCYTES # BLD AUTO: 0.7 K/UL (ref 0.3–1)
MONOCYTES NFR BLD: 9.8 % (ref 4–15)
NEUTROPHILS # BLD AUTO: 4.2 K/UL (ref 1.8–7.7)
NEUTROPHILS NFR BLD: 59.1 % (ref 38–73)
PLATELET # BLD AUTO: 218 K/UL (ref 150–350)
PMV BLD AUTO: 10.5 FL (ref 9.2–12.9)
POTASSIUM SERPL-SCNC: 3.8 MMOL/L (ref 3.5–5.1)
PROT SERPL-MCNC: 7.1 G/DL (ref 6–8.4)
PROTHROMBIN TIME: 10.2 SEC (ref 9–12.5)
RBC # BLD AUTO: 4.21 M/UL (ref 4–5.4)
SODIUM SERPL-SCNC: 137 MMOL/L (ref 136–145)
URATE SERPL-MCNC: 4.5 MG/DL (ref 2.4–5.7)
WBC # BLD AUTO: 7.02 K/UL (ref 3.9–12.7)

## 2019-10-17 PROCEDURE — 85730 THROMBOPLASTIN TIME PARTIAL: CPT

## 2019-10-17 PROCEDURE — 85384 FIBRINOGEN ACTIVITY: CPT

## 2019-10-17 PROCEDURE — 84550 ASSAY OF BLOOD/URIC ACID: CPT

## 2019-10-17 PROCEDURE — 85610 PROTHROMBIN TIME: CPT

## 2019-10-17 PROCEDURE — 25000003 PHARM REV CODE 250: Performed by: SPECIALIST

## 2019-10-17 PROCEDURE — 11000001 HC ACUTE MED/SURG PRIVATE ROOM

## 2019-10-17 PROCEDURE — 85025 COMPLETE CBC W/AUTO DIFF WBC: CPT

## 2019-10-17 PROCEDURE — 63600175 PHARM REV CODE 636 W HCPCS: Performed by: SPECIALIST

## 2019-10-17 PROCEDURE — 72100002 HC LABOR CARE, 1ST 8 HOURS

## 2019-10-17 PROCEDURE — 80053 COMPREHEN METABOLIC PANEL: CPT

## 2019-10-17 PROCEDURE — 27200710 HC EPIDURAL INFUSION PUMP SET: Performed by: EMERGENCY MEDICINE

## 2019-10-17 PROCEDURE — 86880 COOMBS TEST DIRECT: CPT | Mod: 91

## 2019-10-17 PROCEDURE — 86901 BLOOD TYPING SEROLOGIC RH(D): CPT

## 2019-10-17 PROCEDURE — 86870 RBC ANTIBODY IDENTIFICATION: CPT

## 2019-10-17 PROCEDURE — 36415 COLL VENOUS BLD VENIPUNCTURE: CPT

## 2019-10-17 RX ORDER — CALCIUM CARBONATE 200(500)MG
500 TABLET,CHEWABLE ORAL 3 TIMES DAILY PRN
Status: DISCONTINUED | OUTPATIENT
Start: 2019-10-17 | End: 2019-10-21 | Stop reason: HOSPADM

## 2019-10-17 RX ORDER — SIMETHICONE 80 MG
1 TABLET,CHEWABLE ORAL 4 TIMES DAILY PRN
Status: DISCONTINUED | OUTPATIENT
Start: 2019-10-17 | End: 2019-10-21 | Stop reason: HOSPADM

## 2019-10-17 RX ORDER — TERBUTALINE SULFATE 1 MG/ML
0.25 INJECTION SUBCUTANEOUS
Status: DISCONTINUED | OUTPATIENT
Start: 2019-10-17 | End: 2019-10-21 | Stop reason: HOSPADM

## 2019-10-17 RX ORDER — SODIUM CHLORIDE, SODIUM LACTATE, POTASSIUM CHLORIDE, CALCIUM CHLORIDE 600; 310; 30; 20 MG/100ML; MG/100ML; MG/100ML; MG/100ML
INJECTION, SOLUTION INTRAVENOUS CONTINUOUS
Status: DISCONTINUED | OUTPATIENT
Start: 2019-10-17 | End: 2019-10-18

## 2019-10-17 RX ORDER — ONDANSETRON 8 MG/1
8 TABLET, ORALLY DISINTEGRATING ORAL EVERY 8 HOURS PRN
Status: DISCONTINUED | OUTPATIENT
Start: 2019-10-17 | End: 2019-10-21 | Stop reason: HOSPADM

## 2019-10-17 RX ORDER — MISOPROSTOL 100 MCG
25 TABLET ORAL EVERY 4 HOURS
Status: DISCONTINUED | OUTPATIENT
Start: 2019-10-17 | End: 2019-10-18

## 2019-10-17 RX ORDER — SODIUM CHLORIDE 9 MG/ML
INJECTION, SOLUTION INTRAVENOUS
Status: DISCONTINUED | OUTPATIENT
Start: 2019-10-17 | End: 2019-10-21 | Stop reason: HOSPADM

## 2019-10-17 RX ORDER — OXYTOCIN/RINGER'S LACTATE 30/500 ML
334 PLASTIC BAG, INJECTION (ML) INTRAVENOUS ONCE
Status: DISCONTINUED | OUTPATIENT
Start: 2019-10-17 | End: 2019-10-21 | Stop reason: HOSPADM

## 2019-10-17 RX ORDER — OXYTOCIN/RINGER'S LACTATE 30/500 ML
95 PLASTIC BAG, INJECTION (ML) INTRAVENOUS ONCE
Status: DISCONTINUED | OUTPATIENT
Start: 2019-10-17 | End: 2019-10-21 | Stop reason: HOSPADM

## 2019-10-17 RX ORDER — SODIUM CHLORIDE, SODIUM LACTATE, POTASSIUM CHLORIDE, CALCIUM CHLORIDE 600; 310; 30; 20 MG/100ML; MG/100ML; MG/100ML; MG/100ML
INJECTION, SOLUTION INTRAVENOUS CONTINUOUS
Status: DISCONTINUED | OUTPATIENT
Start: 2019-10-17 | End: 2019-10-21 | Stop reason: HOSPADM

## 2019-10-17 RX ADMIN — SODIUM CHLORIDE, SODIUM LACTATE, POTASSIUM CHLORIDE, AND CALCIUM CHLORIDE: .6; .31; .03; .02 INJECTION, SOLUTION INTRAVENOUS at 06:10

## 2019-10-17 RX ADMIN — Medication 25 MCG: at 02:10

## 2019-10-17 RX ADMIN — Medication 25 MCG: at 06:10

## 2019-10-17 NOTE — NURSING
1:22 PM  Received critical value from blood bank.  Pt is antibody +.  If blood transfusion is required will need emergency release.

## 2019-10-17 NOTE — ANESTHESIA PREPROCEDURE EVALUATION
10/17/2019  Lynn Green is a 33 y.o., female  with no signfiicant PMH who presents to L&D for induction of labor for  BPP    Anesthesia Evaluation    I have reviewed the Patient Summary Reports.    I have reviewed the Nursing Notes.   I have reviewed the Medications.     Review of Systems  Anesthesia Hx:  History of prior surgery of interest to airway management or planning:  Denies Personal Hx of Anesthesia complications.   Cardiovascular:  Cardiovascular Normal Exercise tolerance: good     Pulmonary:  Pulmonary Normal    Hepatic/GI:  Hepatic/GI Normal    Neurological:  Neurology Normal    Endocrine:  Endocrine Normal        Physical Exam  General:  Well nourished    Airway/Jaw/Neck:  Airway Findings: Mouth Opening: Normal Tongue: Normal  General Airway Assessment: Adult  Mallampati: II  Improves to I with phonation.  TM Distance: Normal, at least 6 cm  Jaw/Neck Findings:  Neck ROM: Normal ROM      Dental:  Dental Findings: In tact   Chest/Lungs:  Chest/Lungs Findings: Clear to auscultation     Heart/Vascular:  Heart Findings: Rate: Normal  Rhythm: Regular Rhythm        Mental Status:  Mental Status Findings:  Cooperative, Alert and Oriented         Anesthesia Plan  Type of Anesthesia, risks & benefits discussed:  Anesthesia Type:  epidural, CSE, spinal, general, MAC, regional  Patient's Preference:   Intra-op Monitoring Plan: standard ASA monitors  Intra-op Monitoring Plan Comments:   Post Op Pain Control Plan: multimodal analgesia  Post Op Pain Control Plan Comments:   Induction:   IV  Beta Blocker:  Patient is not currently on a Beta-Blocker (No further documentation required).       Informed Consent: Patient understands risks and agrees with Anesthesia plan.  Questions answered. Anesthesia consent signed with patient.  ASA Score: 2     Day of Surgery Review of History & Physical: I have  interviewed and examined the patient. I have reviewed the patient's H&P dated:  There are no significant changes.          Ready For Surgery From Anesthesia Perspective.

## 2019-10-18 LAB
BILIRUB UR QL STRIP: NEGATIVE
CLARITY UR: CLEAR
COLOR UR: YELLOW
GLUCOSE UR QL STRIP: NEGATIVE
HGB UR QL STRIP: NEGATIVE
KETONES UR QL STRIP: ABNORMAL
LEUKOCYTE ESTERASE UR QL STRIP: NEGATIVE
NITRITE UR QL STRIP: NEGATIVE
PH UR STRIP: 8 [PH] (ref 5–8)
PROT UR QL STRIP: NEGATIVE
RPR SER QL: REACTIVE
RPR SER-TITR: ABNORMAL {TITER}
SP GR UR STRIP: 1.01 (ref 1–1.03)
URN SPEC COLLECT METH UR: ABNORMAL
UROBILINOGEN UR STRIP-ACNC: NEGATIVE EU/DL

## 2019-10-18 PROCEDURE — 27800516 HC TRAY, EPIDURAL COMBO: Performed by: STUDENT IN AN ORGANIZED HEALTH CARE EDUCATION/TRAINING PROGRAM

## 2019-10-18 PROCEDURE — 62326 NJX INTERLAMINAR LMBR/SAC: CPT | Performed by: EMERGENCY MEDICINE

## 2019-10-18 PROCEDURE — 51702 INSERT TEMP BLADDER CATH: CPT

## 2019-10-18 PROCEDURE — 81003 URINALYSIS AUTO W/O SCOPE: CPT

## 2019-10-18 PROCEDURE — 36000684 HC CESAREAN SECTION, UNSCHEDULED

## 2019-10-18 PROCEDURE — 36415 COLL VENOUS BLD VENIPUNCTURE: CPT

## 2019-10-18 PROCEDURE — 27200710 HC EPIDURAL INFUSION PUMP SET: Performed by: EMERGENCY MEDICINE

## 2019-10-18 PROCEDURE — 86780 TREPONEMA PALLIDUM: CPT

## 2019-10-18 PROCEDURE — 72100003 HC LABOR CARE, EA. ADDL. 8 HRS

## 2019-10-18 PROCEDURE — 72100002 HC LABOR CARE, 1ST 8 HOURS

## 2019-10-18 PROCEDURE — 27201127 HC VACUUM EXTRACTOR

## 2019-10-18 PROCEDURE — 11000001 HC ACUTE MED/SURG PRIVATE ROOM

## 2019-10-18 PROCEDURE — 86592 SYPHILIS TEST NON-TREP QUAL: CPT

## 2019-10-18 PROCEDURE — 63600175 PHARM REV CODE 636 W HCPCS: Performed by: SPECIALIST

## 2019-10-18 PROCEDURE — 86593 SYPHILIS TEST NON-TREP QUANT: CPT

## 2019-10-18 PROCEDURE — 25000003 PHARM REV CODE 250: Performed by: EMERGENCY MEDICINE

## 2019-10-18 RX ORDER — FENTANYL/BUPIVACAINE/NS/PF 2MCG/ML-.1
PLASTIC BAG, INJECTION (ML) INJECTION CONTINUOUS PRN
Status: DISCONTINUED | OUTPATIENT
Start: 2019-10-18 | End: 2019-10-19

## 2019-10-18 RX ORDER — FENTANYL CITRATE 50 UG/ML
INJECTION, SOLUTION INTRAMUSCULAR; INTRAVENOUS
Status: DISPENSED
Start: 2019-10-18 | End: 2019-10-19

## 2019-10-18 RX ORDER — FAMOTIDINE 10 MG/ML
20 INJECTION INTRAVENOUS ONCE
Status: COMPLETED | OUTPATIENT
Start: 2019-10-18 | End: 2019-10-19

## 2019-10-18 RX ORDER — SODIUM CITRATE AND CITRIC ACID MONOHYDRATE 334; 500 MG/5ML; MG/5ML
30 SOLUTION ORAL ONCE
Status: COMPLETED | OUTPATIENT
Start: 2019-10-18 | End: 2019-10-19

## 2019-10-18 RX ORDER — OXYTOCIN/RINGER'S LACTATE 30/500 ML
2 PLASTIC BAG, INJECTION (ML) INTRAVENOUS CONTINUOUS
Status: DISCONTINUED | OUTPATIENT
Start: 2019-10-18 | End: 2019-10-21 | Stop reason: HOSPADM

## 2019-10-18 RX ORDER — DEXTROSE, SODIUM CHLORIDE, SODIUM LACTATE, POTASSIUM CHLORIDE, AND CALCIUM CHLORIDE 5; .6; .31; .03; .02 G/100ML; G/100ML; G/100ML; G/100ML; G/100ML
INJECTION, SOLUTION INTRAVENOUS CONTINUOUS
Status: DISCONTINUED | OUTPATIENT
Start: 2019-10-18 | End: 2019-10-21 | Stop reason: HOSPADM

## 2019-10-18 RX ORDER — ROPIVACAINE HYDROCHLORIDE 2 MG/ML
INJECTION, SOLUTION EPIDURAL; INFILTRATION; PERINEURAL
Status: DISPENSED
Start: 2019-10-18 | End: 2019-10-19

## 2019-10-18 RX ADMIN — Medication 2 MILLI-UNITS/MIN: at 04:10

## 2019-10-18 RX ADMIN — SODIUM CHLORIDE, SODIUM LACTATE, POTASSIUM CHLORIDE, AND CALCIUM CHLORIDE: .6; .31; .03; .02 INJECTION, SOLUTION INTRAVENOUS at 10:10

## 2019-10-18 RX ADMIN — DEXTROSE, SODIUM CHLORIDE, SODIUM LACTATE, POTASSIUM CHLORIDE, AND CALCIUM CHLORIDE: 5; .6; .31; .03; .02 INJECTION, SOLUTION INTRAVENOUS at 08:10

## 2019-10-18 RX ADMIN — SODIUM CHLORIDE, SODIUM LACTATE, POTASSIUM CHLORIDE, AND CALCIUM CHLORIDE: .6; .31; .03; .02 INJECTION, SOLUTION INTRAVENOUS at 02:10

## 2019-10-18 RX ADMIN — Medication 12 ML/HR: at 06:10

## 2019-10-18 NOTE — PROGRESS NOTES
PROGRESS IN LABOR  Patient on 16mu/min pitocin, feels some contractions  FHT's 150's with no decelerations  Contractions every 2-4 minutes  Cervix 1cm/30%/-2 vertex, AROM clear at 15:30    PLAN  Continue pitocin

## 2019-10-18 NOTE — NURSING
During epidural insertion, patient c/o a severe headache that improved when she laid down after the procedure was complete. Dr Keyes educated patient on a blood patch, stated that the headache would improve as she laid down and received fluids but they would need to do a blood patch before the epidural was removed. Patient verbalized acknowledgement.

## 2019-10-18 NOTE — PROGRESS NOTES
PROGRESS IN LABOR  Post cytotec times two, had many contractions and irritability last night  FHT's 150's no decelerations, some accelerations  Contractions every one to 3 minutes with couplets on pitocin 10 mu/min  Cervix closed but soft -2 vertex    IMP  Labor induction for oligo, syphilis and poor fetal testing  PLAN  Continue induction, epidural as needed, nursery aware of positive serology and high likelihood of congenital syphilis

## 2019-10-18 NOTE — PLAN OF CARE
"Dr. Sandra called r/t haven't been able to give another dose of cytotec due to UCs being 1-3 minutes apart. Cx is closed and firm, and pt's discomfort has increased from a "2" to a "4" on 0-10 pain scale. Orders given to only give another dose of cytotec once UCs space out or start Pitocin at 0600.   "

## 2019-10-18 NOTE — H&P
CHIEF COMPLAINT:  Sent from the office for poor BPP.    HISTORY OF PRESENT ILLNESS:  Ms. Green is a 33-year-old G1, P0, with   intrauterine pregnancy at 37 weeks.  Her EDC is 11/06/2019.  She was in the   office today and had an NST due to decreased fetal movement as she has been   getting for the last several weeks and the NST today was nonreactive.  She had a   BPP in the office, which showed only 2/10 BPP and two points was for breathing   only.  There was minimal movement, no tone and her JUAN M was verified, was 2 cm.    In light of this situation, it was decided to proceed with delivery.  Her   pregnancy has been complicated by advanced maternal age and her EDC was changed   by ultrasound early in the pregnancy at 8 weeks.  At the beginning of pregnancy,   she was thought to have a false positive RPR.  Her RPR in March was 1:1, but   her FTA was negative.  She had an JUAN MANUEL done at that time and that was negative.    She also had chlamydia at that time and that was treated.  She had a negative   test of cure later in the pregnancy.  At her 26 weeks' lab, the patient had   another positive RPR and this time it was 1:32 positive.  At this time, she had   a positive FTA.  Therefore, it was thought that she converted to positive   serology during her pregnancy.  She was getting Maternal-Fetal Medicine consult   due to her advanced maternal age and at that time, it was noted that she had   shortening of the long bones of her fetus.  Around the same time, the patient   was also noted to have decreased fetal movement.  She was followed in the office   with nonstress test.  At one point, she was admitted to [facility]   overnight for monitoring due to a nonreactive NST and a decrease of fluid of   JUAN M.  Today in the office, she was only noted to have an JUAN M of 2.    PAST MEDICAL HISTORY:  Pertinent for kidney stone before.    FAMILY HISTORY:  Pertinent for diabetes and chronic hypertension and heart   disease  in her mother.    OBSTETRIC HISTORY:  This is her first pregnancy.    ALLERGIES:  SHE IS ALLERGIC TO BACTRIM, WHICH CAUSES HIVES.    During her pregnancy, [facility] with one treatment   course, which was 1.2 million units in each hip of Bicillin and this was done on   August 19th to be exact.  She also had an UTI during this pregnancy.  She had a   negative weight gain during this pregnancy.  She began her pregnancy at 226   pounds and towards the last month began losing weight and was 218 pounds today.    REVIEW OF SYSTEMS:  She has occasional contractions.  She has no chest pain, no   shortness of breath, no vaginal discharge, no vaginal bleeding.    PHYSICAL EXAMINATION:  VITAL SIGNS:  Her blood pressure in the office was 129/89.  She had 2+ protein.    Her weight was 218 pounds.  GENERAL:  No distress, alert and oriented.  HEART:  Regular rate and rhythm without murmur.  CHEST:  Clear to auscultation bilaterally.  ABDOMEN:  Gravid and nontender.  EXTREMITIES:  No edema.  NEUROLOGIC:  Her DTRs are 1+.  PELVIC:  Her cervix was long, thick and closed.    An ultrasound showed a vertex presentation baby.  Fetal heart tones were in the   140s and nonreactive since she has been admitted to the hospital, she has   minimal reactivity, but no decelerations.  Currently, she is getting Cytotec and   has irregular contractions.    LABORATORIES:  Her beta strep is negative.  Hemoglobin is 11.9, hematocrit 35.8.    Her PT is 10.2.  PTT was elevated at 33.  Her fibrinogen is 439.  Her liver   functions are normal.  Her blood type is A positive.  She has a positive   antibody, which appears to be an undetermined specificity according to the lab   results.  She had 2+ protein in the office in her urine.  She had a negative   noninvasive fetal testing, which showed a normal female.  Her last ultrasound   with Dr. Dugan in Maternal-Fetal Medicine showed short long bones, but no   intracranial problems, no  bowing of the legs.  At that time, it was discussed   with Dr. Dugan and she stated that the patient could have a vaginal delivery   unless otherwise noted.  She discussed this with the patient before also.  There   was a discussion with the pediatrician, Dr. Hickey, who states that he discussed   congenital syphilis with myself and with the patient.    IMPRESSION:  1.  Intrauterine pregnancy at 37 weeks.  2.  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  3.  Advanced maternal age.  4.  Oligohydramnios.  5.  Poor biophysical profile.    PLAN:  To admit the patient, give her Cytotec and epidural as necessary and   hopefully expect a vaginal delivery.  It was discussed with the patient that the   baby will need probably at least 10 days of IV penicillin and the patient   expresses understanding and is appropriate.      EDWIN/KATHARINE  dd: 10/17/2019 22:27:46 (CDT)  td: 10/18/2019 02:25:06 (CDT)  Doc ID   #[id number]  Job ID #[id number]    CC:

## 2019-10-18 NOTE — ANESTHESIA PROCEDURE NOTES
CSE    Patient location during procedure: OB  Start time: 10/18/2019 6:00 PM  Timeout: 10/18/2019 6:00 PM  End time: 10/18/2019 6:30 PM    Staffing  Authorizing Provider: Gallito Olvera MD  Performing Provider: Gallito Olvera MD    Preanesthetic Checklist  Completed: patient identified, site marked, surgical consent, pre-op evaluation, timeout performed, IV checked, risks and benefits discussed and monitors and equipment checked  CSE  Patient position: sitting  Prep: ChloraPrep  Patient monitoring: heart rate, cardiac monitor, continuous pulse ox, continuous capnometry and frequent blood pressure checks  Approach: midline  Spinal Needle  Needle type: pencil-tip   Needle gauge: 25 G  Needle length: 5 in  Epidural Needle  Injection technique: RYAN air  Needle type: Tuohy   Needle gauge: 17 G  Needle length: 3.5 in  Needle insertion depth: 8.5 cm  Location: L3-4  Needle localization: anatomical landmarks  Catheter  Catheter type: springwound  Catheter size: 19 G  Catheter at skin depth: 14 cm  Test dose: lidocaine 1.5% with Epi 1-to-200,000  Additional Documentation: incremental injection, negative aspiration for CSF, left transient paresthesia, negative aspiration for heme and negative test dose  Assessment  Sensory level: T6   Dermatomal levels determined by alcohol swab  Additional Notes  Epidural placed and spinal given. No CSF from Tuohy. Catheter placed and patient w/ immediate headache. Catheter draws back CSF. Repeat epidural placed w/o complication.

## 2019-10-18 NOTE — PLAN OF CARE
Dr. Sandra would like another dose of vaginal cytotec when able to administer per UC parameters, and to start Pitocin at 0600

## 2019-10-18 NOTE — NURSING
Dr Sandra called in for update: Pitocin is on 14 mu/min, patient is flora every 1-3 minutes, rating pain at a 7, no epidural yet, reactive fetal strip. MD said she would come to bedside to assess later in the day.

## 2019-10-19 PROBLEM — O98.113 SYPHILIS AFFECTING PREGNANCY IN THIRD TRIMESTER: Status: ACTIVE | Noted: 2019-10-19

## 2019-10-19 PROCEDURE — 37000008 HC ANESTHESIA 1ST 15 MINUTES: Performed by: SPECIALIST

## 2019-10-19 PROCEDURE — 25000003 PHARM REV CODE 250: Performed by: STUDENT IN AN ORGANIZED HEALTH CARE EDUCATION/TRAINING PROGRAM

## 2019-10-19 PROCEDURE — 63600175 PHARM REV CODE 636 W HCPCS: Performed by: STUDENT IN AN ORGANIZED HEALTH CARE EDUCATION/TRAINING PROGRAM

## 2019-10-19 PROCEDURE — S0028 INJECTION, FAMOTIDINE, 20 MG: HCPCS | Performed by: STUDENT IN AN ORGANIZED HEALTH CARE EDUCATION/TRAINING PROGRAM

## 2019-10-19 PROCEDURE — 36000684 HC CESAREAN SECTION, UNSCHEDULED

## 2019-10-19 PROCEDURE — 25000003 PHARM REV CODE 250: Performed by: SPECIALIST

## 2019-10-19 PROCEDURE — 63600175 PHARM REV CODE 636 W HCPCS: Performed by: SPECIALIST

## 2019-10-19 PROCEDURE — 37000009 HC ANESTHESIA EA ADD 15 MINS: Performed by: SPECIALIST

## 2019-10-19 PROCEDURE — 11000001 HC ACUTE MED/SURG PRIVATE ROOM

## 2019-10-19 RX ORDER — SODIUM CITRATE AND CITRIC ACID MONOHYDRATE 334; 500 MG/5ML; MG/5ML
30 SOLUTION ORAL
Status: DISCONTINUED | OUTPATIENT
Start: 2019-10-19 | End: 2019-10-21 | Stop reason: HOSPADM

## 2019-10-19 RX ORDER — DOCUSATE SODIUM 100 MG/1
200 CAPSULE, LIQUID FILLED ORAL 2 TIMES DAILY
Status: DISCONTINUED | OUTPATIENT
Start: 2019-10-19 | End: 2019-10-21 | Stop reason: HOSPADM

## 2019-10-19 RX ORDER — ONDANSETRON 2 MG/ML
4 INJECTION INTRAMUSCULAR; INTRAVENOUS EVERY 6 HOURS PRN
Status: ACTIVE | OUTPATIENT
Start: 2019-10-19 | End: 2019-10-20

## 2019-10-19 RX ORDER — CEFAZOLIN SODIUM 2 G/50ML
2 SOLUTION INTRAVENOUS ONCE AS NEEDED
Status: COMPLETED | OUTPATIENT
Start: 2019-10-19 | End: 2019-10-19

## 2019-10-19 RX ORDER — DIPHENHYDRAMINE HCL 25 MG
25 CAPSULE ORAL EVERY 4 HOURS PRN
Status: DISCONTINUED | OUTPATIENT
Start: 2019-10-19 | End: 2019-10-21 | Stop reason: HOSPADM

## 2019-10-19 RX ORDER — SIMETHICONE 80 MG
1 TABLET,CHEWABLE ORAL EVERY 6 HOURS PRN
Status: DISCONTINUED | OUTPATIENT
Start: 2019-10-19 | End: 2019-10-21 | Stop reason: HOSPADM

## 2019-10-19 RX ORDER — OXYTOCIN/RINGER'S LACTATE 30/500 ML
95 PLASTIC BAG, INJECTION (ML) INTRAVENOUS ONCE
Status: DISCONTINUED | OUTPATIENT
Start: 2019-10-19 | End: 2019-10-21 | Stop reason: HOSPADM

## 2019-10-19 RX ORDER — ONDANSETRON 8 MG/1
8 TABLET, ORALLY DISINTEGRATING ORAL EVERY 8 HOURS PRN
Status: DISCONTINUED | OUTPATIENT
Start: 2019-10-19 | End: 2019-10-21 | Stop reason: HOSPADM

## 2019-10-19 RX ORDER — ACETAMINOPHEN 325 MG/1
650 TABLET ORAL EVERY 6 HOURS
Status: DISPENSED | OUTPATIENT
Start: 2019-10-19 | End: 2019-10-20

## 2019-10-19 RX ORDER — MISOPROSTOL 200 UG/1
800 TABLET ORAL
Status: DISCONTINUED | OUTPATIENT
Start: 2019-10-19 | End: 2019-10-21 | Stop reason: HOSPADM

## 2019-10-19 RX ORDER — OXYCODONE HYDROCHLORIDE 5 MG/1
10 TABLET ORAL EVERY 4 HOURS PRN
Status: DISPENSED | OUTPATIENT
Start: 2019-10-19 | End: 2019-10-20

## 2019-10-19 RX ORDER — MUPIROCIN 20 MG/G
OINTMENT TOPICAL
Status: CANCELLED | OUTPATIENT
Start: 2019-10-19

## 2019-10-19 RX ORDER — KETOROLAC TROMETHAMINE 30 MG/ML
INJECTION, SOLUTION INTRAMUSCULAR; INTRAVENOUS
Status: DISCONTINUED | OUTPATIENT
Start: 2019-10-19 | End: 2019-10-19

## 2019-10-19 RX ORDER — BISACODYL 10 MG
10 SUPPOSITORY, RECTAL RECTAL ONCE AS NEEDED
Status: DISCONTINUED | OUTPATIENT
Start: 2019-10-19 | End: 2019-10-21 | Stop reason: HOSPADM

## 2019-10-19 RX ORDER — ONDANSETRON 2 MG/ML
INJECTION INTRAMUSCULAR; INTRAVENOUS
Status: DISCONTINUED | OUTPATIENT
Start: 2019-10-19 | End: 2019-10-19

## 2019-10-19 RX ORDER — MORPHINE SULFATE ORAL SOLUTION 10 MG/5ML
SOLUTION ORAL
Status: DISCONTINUED | OUTPATIENT
Start: 2019-10-19 | End: 2019-10-19

## 2019-10-19 RX ORDER — AMOXICILLIN 250 MG
1 CAPSULE ORAL NIGHTLY PRN
Status: DISCONTINUED | OUTPATIENT
Start: 2019-10-19 | End: 2019-10-21 | Stop reason: HOSPADM

## 2019-10-19 RX ORDER — HYDROCORTISONE 25 MG/G
CREAM TOPICAL 3 TIMES DAILY PRN
Status: DISCONTINUED | OUTPATIENT
Start: 2019-10-19 | End: 2019-10-21 | Stop reason: HOSPADM

## 2019-10-19 RX ORDER — PHENYLEPHRINE HYDROCHLORIDE 10 MG/ML
INJECTION INTRAVENOUS
Status: DISCONTINUED | OUTPATIENT
Start: 2019-10-19 | End: 2019-10-19

## 2019-10-19 RX ORDER — OXYTOCIN 10 [USP'U]/ML
INJECTION, SOLUTION INTRAMUSCULAR; INTRAVENOUS
Status: DISCONTINUED | OUTPATIENT
Start: 2019-10-19 | End: 2019-10-19

## 2019-10-19 RX ORDER — OXYCODONE HYDROCHLORIDE 5 MG/1
5 TABLET ORAL EVERY 4 HOURS PRN
Status: ACTIVE | OUTPATIENT
Start: 2019-10-19 | End: 2019-10-20

## 2019-10-19 RX ORDER — KETOROLAC TROMETHAMINE 30 MG/ML
30 INJECTION, SOLUTION INTRAMUSCULAR; INTRAVENOUS EVERY 6 HOURS
Status: COMPLETED | OUTPATIENT
Start: 2019-10-19 | End: 2019-10-20

## 2019-10-19 RX ORDER — SODIUM CHLORIDE, SODIUM LACTATE, POTASSIUM CHLORIDE, CALCIUM CHLORIDE 600; 310; 30; 20 MG/100ML; MG/100ML; MG/100ML; MG/100ML
INJECTION, SOLUTION INTRAVENOUS CONTINUOUS
Status: ACTIVE | OUTPATIENT
Start: 2019-10-19 | End: 2019-10-19

## 2019-10-19 RX ORDER — ADHESIVE BANDAGE
30 BANDAGE TOPICAL 2 TIMES DAILY PRN
Status: DISCONTINUED | OUTPATIENT
Start: 2019-10-20 | End: 2019-10-21 | Stop reason: HOSPADM

## 2019-10-19 RX ADMIN — SODIUM CITRATE AND CITRIC ACID MONOHYDRATE 30 ML: 500; 334 SOLUTION ORAL at 06:10

## 2019-10-19 RX ADMIN — SODIUM CHLORIDE, SODIUM LACTATE, POTASSIUM CHLORIDE, AND CALCIUM CHLORIDE: .6; .31; .03; .02 INJECTION, SOLUTION INTRAVENOUS at 07:10

## 2019-10-19 RX ADMIN — PHENYLEPHRINE HYDROCHLORIDE 200 MCG: 10 INJECTION INTRAVENOUS at 07:10

## 2019-10-19 RX ADMIN — DOCUSATE SODIUM 200 MG: 100 CAPSULE, LIQUID FILLED ORAL at 09:10

## 2019-10-19 RX ADMIN — OXYCODONE HYDROCHLORIDE 10 MG: 5 TABLET ORAL at 11:10

## 2019-10-19 RX ADMIN — CEFAZOLIN SODIUM 2 G: 2 SOLUTION INTRAVENOUS at 06:10

## 2019-10-19 RX ADMIN — KETOROLAC TROMETHAMINE 30 MG: 30 INJECTION, SOLUTION INTRAMUSCULAR; INTRAVENOUS at 07:10

## 2019-10-19 RX ADMIN — KETOROLAC TROMETHAMINE 30 MG: 30 INJECTION, SOLUTION INTRAMUSCULAR at 09:10

## 2019-10-19 RX ADMIN — OXYTOCIN 30 UNITS: 10 INJECTION, SOLUTION INTRAMUSCULAR; INTRAVENOUS at 07:10

## 2019-10-19 RX ADMIN — KETOROLAC TROMETHAMINE 30 MG: 30 INJECTION, SOLUTION INTRAMUSCULAR at 02:10

## 2019-10-19 RX ADMIN — PHENYLEPHRINE HYDROCHLORIDE 100 MCG: 10 INJECTION INTRAVENOUS at 07:10

## 2019-10-19 RX ADMIN — ONDANSETRON 4 MG: 2 INJECTION, SOLUTION INTRAMUSCULAR; INTRAVENOUS at 07:10

## 2019-10-19 RX ADMIN — MORPHINE SULFATE 8 MG: 10 SOLUTION ORAL at 08:10

## 2019-10-19 RX ADMIN — FAMOTIDINE 20 MG: 10 INJECTION, SOLUTION INTRAVENOUS at 06:10

## 2019-10-19 RX ADMIN — AZITHROMYCIN MONOHYDRATE 500 MG: 500 INJECTION, POWDER, LYOPHILIZED, FOR SOLUTION INTRAVENOUS at 06:10

## 2019-10-19 RX ADMIN — PROMETHAZINE HYDROCHLORIDE 12.5 MG: 25 INJECTION INTRAMUSCULAR; INTRAVENOUS at 10:10

## 2019-10-19 RX ADMIN — SODIUM CHLORIDE, SODIUM LACTATE, POTASSIUM CHLORIDE, AND CALCIUM CHLORIDE: .6; .31; .03; .02 INJECTION, SOLUTION INTRAVENOUS at 10:10

## 2019-10-19 NOTE — NURSING
Pt turned to right side, pitocin decreased to 10mu, ivf bolus of 500cc started. For decreased variability, no accels.

## 2019-10-19 NOTE — NURSING
Ancef 2gm ivpb up first; then azithromycin 500mg ivpb. fht's now with mod variability. Accels, no decels.

## 2019-10-19 NOTE — LACTATION NOTE
KidZuihony pump, tubing, collections containers brought to bedside.  Discussed proper pump setting of initiation phase.  Instructed on proper usage of pump and to adjust suction according to maximum comfort level.  Verified appropriate flange fit.  Educated on the frequency and duration of pumping in order to promote and maintain a full milk supply.  Hands on pumping technique reviewed.  Encouraged hand expression after pumping.  Instructed on cleaning of breast pump parts.  Written instructions also given.  Pt verbalized understanding.  However mother was falling asleep during pump  set up demonstration and will likely need reinforcement.  Mom will continue to pump/hand express at least 8+ times/24 hrs for  nicu baby. Symphony pump at bs. Reviewed use/cleaning. Stressed importance of hand hygiene & keeping pump kit clean. Will collect, label, store & transport EBM as instructed. Will call for any needs.   Mom will continue to pump/hand express at least 8+ times/24 hrs for  nicu baby. Symphony pump at bs. Reviewed use/cleaning. Stressed importance of hand hygiene & keeping pump kit clean. Will collect, label, store & transport EBM as instructed. Will call for any needs.

## 2019-10-19 NOTE — PLAN OF CARE
"0650 Report received at bedside, assumed care of  IOL for BPP 2/8, +RPR treated in pregnancy x's 1, "s. Maternal temp 102.6 Dr Sandra at bedside and aware, EFM and TOCo removed belly cleaned and then used DAVON wipes, moved to OR 2 .  bpm in OR-see delivery summary and OR record. Blood patch done by anesthesia in OR prior to moving back to 361.   "

## 2019-10-19 NOTE — NURSING
scd's placed, abdomen wiped and clipped. Iv bolus in progress. 20mg ivp pepcid and 30cc bicitra given.

## 2019-10-19 NOTE — NURSING
0859am=  Admitted to unit, via bed, resp even and unlabored.  Accompanied by L&D RN and family.  No distress noted.  0904am=  Awake, in bed, resp even and unlabored.  IV Lt hand infusing Pitocin at 95 ml/hr, via pump, without difficulty.  Clark patent draining clear,  dark yellow urine, via gravity.  POC reviewed with pt.  Verbalized understanding.  Oriented to room, surroundings, and call light.  Verbalized understanding.  Resting quietly with no distress.  Safey maintained with call light in reach.

## 2019-10-19 NOTE — NURSING
1048am=  Pt reports drank some apple juice, and vomited approximately 150ml clear liquid noted.  Pt denies feeling nauseated prior to vomiting, and denies nausea at present.    1052am=  Medicated with Phenergan 12.5 mg IVPB PRN as ordered.  Will continue to monitor.  Safety maintained with call light in reach.   1130pm=  Denies nausea, and no vomiting noted  No distress noted.    1200pm=  Resting quietly with no distress noted.  Denies N/V.  1300pm=  Pt consumed clear liquid diet lunch tray, and tolerated it well.  Denies N/V.

## 2019-10-19 NOTE — TRANSFER OF CARE
"Anesthesia Transfer of Care Note    Patient: Lynn Green    Procedure(s) Performed: * No procedures listed *    Patient location: Labor and Delivery    Anesthesia Type: epidural    Transport from OR: Transported from OR on 6-10 L/min O2 by face mask with adequate spontaneous ventilation    Post pain: adequate analgesia    Post assessment: no apparent anesthetic complications    Post vital signs: stable    Level of consciousness: awake and alert    Nausea/Vomiting: no nausea/vomiting    Complications: none    Transfer of care protocol was followed      Last vitals:   Visit Vitals  BP (!) 132/93   Pulse (!) 126   Temp (!) 39.2 °C (102.6 °F) (Oral)   Resp 20   Ht 5' 2" (1.575 m)   Wt 98 kg (216 lb)   LMP 06/11/2018   SpO2 100%   Breastfeeding? Yes   BMI 39.51 kg/m²     "

## 2019-10-19 NOTE — L&D DELIVERY NOTE
DATE OF PROCEDURE:  10/19/2019    FINDINGS:  The patient had a low transverse , delivered a viable female   infant; Apgars were 9 and 9.  Please see the operative report.      LGC/HN  dd: 10/19/2019 08:52:42 (CDT)  td: 10/19/2019 10:06:05 (CDT)  Doc ID   #[id number]  Job ID #[id number]    CC:

## 2019-10-19 NOTE — PLAN OF CARE
Plan of care reviewed with pt upon admit. MERRY. Eduardo patent. Pt with vomiting earlier this shift,  but is now Tolerating regular diet.  Reports pain relieved with ordered pain meds administered.   Questions encouraged and answered.  Infant at [facility].  Mother using  breast pump .

## 2019-10-19 NOTE — NURSING
sve- tight 3cm dil. Scalp stim done, baby responded with accel. Late decel after, pt turned to her right side, decel resolved.

## 2019-10-19 NOTE — NURSING
fht's now with minimal variability. No improvement changing from left side to right. Pitocin turned off, o2 started at 10l per fm, ivf bolus of lr started. Dr ortega notified; will do a c/s at 0700. anesthesia notified, first assist notified, poc discussed with pt.   Pt states she is ok with c/s, does not want baby to get stressed out.

## 2019-10-19 NOTE — PROGRESS NOTES
"PRE OP NOTE  Now with elevated temperature 101, FHT's with minimal variability,, pitocin was at 20 mu/min but no further progression beyond 3 cm  IMP  IUP at 37 weeks  Syphilis  Failure to progress  Chorioamnionitis  Advanced maternal age  Non reassuring FHT"s  PLAN  Abdominal delivery  Nursery notified  antibiotics  "

## 2019-10-19 NOTE — LACTATION NOTE
[facility]  Lactation Note - Mom    SUMMARY     Maternal Assessment    Breast Size Issue: none  Breast Shape: Bilateral:, pendulous  Breast Density: Bilateral:, soft  Areola: Bilateral:, elastic  Nipples: Bilateral:, graspable, everted(JONATHAN AFTER STIM)  Preferred Pain Scale: number (Numeric Rating Pain Scale)  Comfort/Acceptable Pain Level: 5  Pain Body Location - Side: Bilateral  Pain Body Location - Orientation: lower  Pain Body Location: abdomen  Pain Rating (0-10): Rest: 0  Pain Rating (0-10): Activity: 0  Pain Rating: Rest: 0 - no pain  Pain Rating: Activity: 0 - no pain  Pain Radiation to: back  Frequency: frequent  Quality: cramping  Sleep/Rest/Relaxation: appears asleep  POSS (Pasero Opioid-Induced Sed Scale): 1 - Awake and alert  Additional Documentation: (Pt decline epidural at this time)    LATCH Score         Breasts WDL    Breast WDL: WDL    Maternal Infant Feeding    Maternal Preparation: breast care, hand hygiene  Maternal Emotional State: independent, assist needed    Lactation Referrals         Lactation Interventions    Breastfeeding Assistance: support offered, electric breast pump used, other (see comments)(breast pump set up )  Breastfeeding Support: support offered, electric breast pump used, encouragement provided  Breastfeeding Assistance: support offered, electric breast pump used, other (see comments)(breast pump set up )       Breastfeeding Session    Breast Pumping Interventions: early pumping promoted, frequent pumping encouraged    Maternal Information    Person Making Referral: physician(dr barry:baby being transferred  to [facility] NICU)  Maternal Reason for Referral: breastfeeding currently, other (see comments)(NICU BABY)

## 2019-10-19 NOTE — ANESTHESIA PROCEDURE NOTES
Epidural Blood Patch    Patient location during procedure: OR   Reason for epidural blood patch: at surgeon's request, post-op pain management, post dural puncture headache  Start time: 10/19/2019 7:51 AM  Timeout: 10/19/2019 7:50 AM  End time: 10/19/2019 7:53 AM     Staffing  Authorizing Provider: David Santana MD  Performing Provider: Jay Soliman MD    Preanesthetic Checklist  Completed: patient identified, site marked, surgical consent, pre-op evaluation, timeout performed, IV checked, monitors and equipment checked and anesthesia consent given  Preparation  Patient position: sitting  Prep: ChloraPrep  Patient monitoring: ECG, Pulse Ox, continuous capnometry and Blood Pressure  Epidural Blood Patch  Skin Wheal: 0 mL  Approach: midline  Interspace: L2-3  Blood was aseptically collected.  Collection Location: venous    Needle and Epidural Catheter  Needle type: Tuohy   Needle gauge: 22  Needle length: 3.5 inches  Needle insertion depth: 4 cm  Additional Documentation: incremental injection, negative aspiration for heme and CSF, no paresthesia on injection, no signs/symptoms of IV or SA injection and no significant pain on injection  Needle localization: anatomical landmarks, nerve stimulator and paresthesias  Assessment  Ease of injection: easy  Patient's tolerance of the procedure: comfortable throughoutAmount of blood injected: 20mL.

## 2019-10-19 NOTE — NURSING
Report received. Pt aaox3. No c/o, nad. Epidural infusing with good pain relief reported. Pitocin at 20mu/min per pump. lr at 125cc/hr per pump. No problems at iv site. Clark patent, draining clear yellow urine to urometer bag.

## 2019-10-20 LAB
BASOPHILS # BLD AUTO: 0.01 K/UL (ref 0–0.2)
BASOPHILS NFR BLD: 0.1 % (ref 0–1.9)
DIFFERENTIAL METHOD: ABNORMAL
EOSINOPHIL # BLD AUTO: 0.1 K/UL (ref 0–0.5)
EOSINOPHIL NFR BLD: 0.6 % (ref 0–8)
ERYTHROCYTE [DISTWIDTH] IN BLOOD BY AUTOMATED COUNT: 15.6 % (ref 11.5–14.5)
HCT VFR BLD AUTO: 28 % (ref 37–48.5)
HGB BLD-MCNC: 8.9 G/DL (ref 12–16)
LYMPHOCYTES # BLD AUTO: 1.7 K/UL (ref 1–4.8)
LYMPHOCYTES NFR BLD: 13.7 % (ref 18–48)
MCH RBC QN AUTO: 27.5 PG (ref 27–31)
MCHC RBC AUTO-ENTMCNC: 31.8 G/DL (ref 32–36)
MCV RBC AUTO: 86 FL (ref 82–98)
MONOCYTES # BLD AUTO: 0.9 K/UL (ref 0.3–1)
MONOCYTES NFR BLD: 7.4 % (ref 4–15)
NEUTROPHILS # BLD AUTO: 9.7 K/UL (ref 1.8–7.7)
NEUTROPHILS NFR BLD: 78.2 % (ref 38–73)
PLATELET # BLD AUTO: 191 K/UL (ref 150–350)
PMV BLD AUTO: 10.3 FL (ref 9.2–12.9)
RBC # BLD AUTO: 3.24 M/UL (ref 4–5.4)
WBC # BLD AUTO: 12.41 K/UL (ref 3.9–12.7)

## 2019-10-20 PROCEDURE — 63600175 PHARM REV CODE 636 W HCPCS: Performed by: SPECIALIST

## 2019-10-20 PROCEDURE — 25000003 PHARM REV CODE 250: Performed by: SPECIALIST

## 2019-10-20 PROCEDURE — 36415 COLL VENOUS BLD VENIPUNCTURE: CPT

## 2019-10-20 PROCEDURE — 25000003 PHARM REV CODE 250: Performed by: STUDENT IN AN ORGANIZED HEALTH CARE EDUCATION/TRAINING PROGRAM

## 2019-10-20 PROCEDURE — 11000001 HC ACUTE MED/SURG PRIVATE ROOM

## 2019-10-20 PROCEDURE — 63600175 PHARM REV CODE 636 W HCPCS: Performed by: STUDENT IN AN ORGANIZED HEALTH CARE EDUCATION/TRAINING PROGRAM

## 2019-10-20 PROCEDURE — 85025 COMPLETE CBC W/AUTO DIFF WBC: CPT

## 2019-10-20 RX ORDER — OXYCODONE AND ACETAMINOPHEN 5; 325 MG/1; MG/1
1 TABLET ORAL EVERY 4 HOURS PRN
Status: DISCONTINUED | OUTPATIENT
Start: 2019-10-20 | End: 2019-10-21 | Stop reason: HOSPADM

## 2019-10-20 RX ORDER — OXYCODONE AND ACETAMINOPHEN 10; 325 MG/1; MG/1
1 TABLET ORAL EVERY 4 HOURS PRN
Status: DISCONTINUED | OUTPATIENT
Start: 2019-10-20 | End: 2019-10-21 | Stop reason: HOSPADM

## 2019-10-20 RX ORDER — MUPIROCIN 20 MG/G
1 OINTMENT TOPICAL 2 TIMES DAILY
Status: DISCONTINUED | OUTPATIENT
Start: 2019-10-20 | End: 2019-10-21 | Stop reason: HOSPADM

## 2019-10-20 RX ADMIN — MUPIROCIN 1 G: 20 OINTMENT TOPICAL at 11:10

## 2019-10-20 RX ADMIN — DOCUSATE SODIUM 200 MG: 100 CAPSULE, LIQUID FILLED ORAL at 08:10

## 2019-10-20 RX ADMIN — ACETAMINOPHEN 650 MG: 325 TABLET ORAL at 12:10

## 2019-10-20 RX ADMIN — KETOROLAC TROMETHAMINE 30 MG: 30 INJECTION, SOLUTION INTRAMUSCULAR at 02:10

## 2019-10-20 RX ADMIN — ACETAMINOPHEN 650 MG: 325 TABLET ORAL at 05:10

## 2019-10-20 RX ADMIN — DEXTROSE, SODIUM CHLORIDE, SODIUM LACTATE, POTASSIUM CHLORIDE, AND CALCIUM CHLORIDE: 5; .6; .31; .03; .02 INJECTION, SOLUTION INTRAVENOUS at 03:10

## 2019-10-20 RX ADMIN — DIPHENHYDRAMINE HYDROCHLORIDE 25 MG: 25 CAPSULE ORAL at 10:10

## 2019-10-20 NOTE — ANESTHESIA POSTPROCEDURE EVALUATION
Anesthesia Post Evaluation    Patient: Lynn Green    Procedure(s) Performed: Procedure(s) (LRB):   SECTION (N/A)    Final Anesthesia Type: CSE  Patient location during evaluation: labor & delivery  Patient participation: Yes- Able to Participate  Level of consciousness: awake and alert and oriented  Post-procedure vital signs: reviewed and stable  Pain management: adequate  Airway patency: patent    Anesthetic complications: no      Cardiovascular status: hemodynamically stable  Respiratory status: unassisted, spontaneous ventilation and room air  Hydration status: euvolemic  Follow-up not needed.          Vitals Value Taken Time   /80 10/20/2019  1:51 PM   Temp 37.9 °C (100.2 °F) 10/20/2019  1:51 PM   Pulse 119 10/20/2019  1:51 PM   Resp 18 10/20/2019  1:51 PM   SpO2 99 % 10/20/2019  5:00 AM         No case tracking events are documented in the log.      Pain/Peyman Score: Pain Rating Prior to Med Admin: 6 (10/20/2019 12:27 PM)  Pain Rating Post Med Admin: 0 (10/20/2019  3:30 AM)      No catheter in back.  No headache/neckache/backache - had blood patch yesterday and headache immediately resovled.  Able to urinate, ambulate.  Advised patient to report any new problems of back pain, especially with fever or decreasing bladder function occurring during coming days to weeks.

## 2019-10-20 NOTE — PLAN OF CARE
Reviewed Plan of Care with patient.  Patient verbalized understanding.  Patient exhibits sad facial effect, concern about her infant at another hospitial.  Gave patient words of encouragement with a prayer.  Hope was in the air afterwards.  Safety measures are in place, no injuries or falls noted.  Will continue to monitor patients care.

## 2019-10-20 NOTE — PROGRESS NOTES
POD#1  Post section, complains of some incisional pain, but not too bad. Able to urinate and walk around room  VSS AF  HCT28  gen- no distress  abd- soft non tender, incision bandaged, no drainage  IMP  Post section doing well  Syphilis  Baby was transferred to [facility] for anitibiotics  PLAN  Routine, probable discharge tomorrow

## 2019-10-21 VITALS
WEIGHT: 216 LBS | HEIGHT: 62 IN | OXYGEN SATURATION: 100 % | RESPIRATION RATE: 18 BRPM | DIASTOLIC BLOOD PRESSURE: 85 MMHG | TEMPERATURE: 98 F | HEART RATE: 79 BPM | SYSTOLIC BLOOD PRESSURE: 124 MMHG | BODY MASS INDEX: 39.75 KG/M2

## 2019-10-21 LAB — T PALLIDUM AB SER QL IF: REACTIVE

## 2019-10-21 PROCEDURE — 25000003 PHARM REV CODE 250: Performed by: SPECIALIST

## 2019-10-21 PROCEDURE — 63600175 PHARM REV CODE 636 W HCPCS: Performed by: SPECIALIST

## 2019-10-21 PROCEDURE — 90471 IMMUNIZATION ADMIN: CPT | Performed by: SPECIALIST

## 2019-10-21 PROCEDURE — 90472 IMMUNIZATION ADMIN EACH ADD: CPT | Performed by: SPECIALIST

## 2019-10-21 PROCEDURE — 90686 IIV4 VACC NO PRSV 0.5 ML IM: CPT | Performed by: SPECIALIST

## 2019-10-21 PROCEDURE — 90715 TDAP VACCINE 7 YRS/> IM: CPT | Performed by: SPECIALIST

## 2019-10-21 RX ADMIN — MUPIROCIN 1 G: 20 OINTMENT TOPICAL at 08:10

## 2019-10-21 RX ADMIN — CLOSTRIDIUM TETANI TOXOID ANTIGEN (FORMALDEHYDE INACTIVATED), CORYNEBACTERIUM DIPHTHERIAE TOXOID ANTIGEN (FORMALDEHYDE INACTIVATED), BORDETELLA PERTUSSIS TOXOID ANTIGEN (GLUTARALDEHYDE INACTIVATED), BORDETELLA PERTUSSIS FILAMENTOUS HEMAGGLUTININ ANTIGEN (FORMALDEHYDE INACTIVATED), BORDETELLA PERTUSSIS PERTACTIN ANTIGEN, AND BORDETELLA PERTUSSIS FIMBRIAE 2/3 ANTIGEN 0.5 ML: 5; 2; 2.5; 5; 3; 5 INJECTION, SUSPENSION INTRAMUSCULAR at 08:10

## 2019-10-21 RX ADMIN — OXYCODONE AND ACETAMINOPHEN 1 TABLET: 5; 325 TABLET ORAL at 09:10

## 2019-10-21 RX ADMIN — DOCUSATE SODIUM 200 MG: 100 CAPSULE, LIQUID FILLED ORAL at 08:10

## 2019-10-21 RX ADMIN — OXYCODONE AND ACETAMINOPHEN 1 TABLET: 5; 325 TABLET ORAL at 12:10

## 2019-10-21 RX ADMIN — OXYCODONE AND ACETAMINOPHEN 1 TABLET: 5; 325 TABLET ORAL at 05:10

## 2019-10-21 RX ADMIN — INFLUENZA VIRUS VACCINE 0.5 ML: 15; 15; 15; 15 SUSPENSION INTRAMUSCULAR at 08:10

## 2019-10-21 NOTE — OP NOTE
DATE OF PROCEDURE:  10/19/2019    PREOPERATIVE DIAGNOSES:  1. Intrauterine pregnancy at 37 weeks.  2. Non-reassuring fetal heart tones.  3. 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  4. Failure to progress.  5. Advanced maternal age.  6. Chorioamnionitis.    POSTOPERATIVE DIAGNOSES:  1. Intrauterine pregnancy at 37 weeks.  2. Non-reassuring fetal heart tones.  3. 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  4. Failure to progress.  5. Advanced maternal age.  6. Chorioamnionitis.    PROCEDURE:  Primary low transverse  section via Pfannenstiel.    SURGEON:  Hollie Sandra M.D.    ASSISTANT:  Candida George LPN.    ANESTHESIA:  Spinal.    COMPLICATIONS:  None.    ESTIMATED BLOOD LOSS:  1000 mL.    DRAINS:  Clark to gravity.    HISTORY:  Ms. Green is a 33-year-old G1, P0 with intrauterine pregnancy at 37   weeks.  She was in the office and noted to have a nonreactive NST with   oligohydramnios.  She was given Cytotec and Pitocin, but only progressed to 3   cm.  Then, she was having some fetal tachycardia and non-reassuring fetal heart   tones.  She was at a temperature of 101 before the surgery.  She also was   diagnosed with syphilis during this pregnancy, was treated eight weeks ago, but   on ultrasound, the baby appeared to be.    On ultrasound, the baby had shortened fetal long bones and this was discussed   with the patient before the procedure.  After risks and benefits were discussed.    PROCEDURE IN DETAIL:  The patient was taken to the operating room, placed on the   table in dorsal supine position.  Epidural anesthesia was found to be adequate.    After a negative Allis test, a Pfannenstiel skin incision was made with the   scalpel and carried down through the underlying layer of fat to the fascia.  The   fascia was nicked in the midline and the fascial incision was extended   laterally by sharp dissection.  The rectus muscles were  from the   midline and the rectus muscles were  from the fascia.  This  was a   rather dense tissue.  The peritoneum was entered sharply.  The bladder blade was   inserted and the lower uterine segment was incised in a transverse fashion.  A   small amount of fluid was noted.  The infant's head was delivered with the aid   of the vacuum extractor and then the body was removed.  A viable female infant   was delivered.  Apgars were 9 and 9.  The baby's weight was 5 pounds 14 ounces.        LGC/HN  dd: 10/19/2019 08:39:02 ([facility] [place]note 2[place]  td: 10/19/2019 08:54:18 ([facility] [place][note 2])  Doc ID   #[id number]  Job ID #[id number]    CC:

## 2019-10-21 NOTE — OP NOTE
DATE OF PROCEDURE:  10/19/2019    PREOPERATIVE DIAGNOSES:  1. Intrauterine pregnancy at 37 weeks.  2. Non-reassuring fetal heart tones.  3. 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  4. Failure to progress.  5. Advanced maternal age.  6. Chorioamnionitis.    POSTOPERATIVE DIAGNOSES:  1. Intrauterine pregnancy at 37 weeks.  2. Non-reassuring fetal heart tones.  3. 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  4. Failure to progress.  5. Advanced maternal age.  6. Chorioamnionitis.    PROCEDURE:  Primary low transverse  section via Pfannenstiel.    SURGEON:  Hollie Sandra M.D.    ASSISTANT:  Candida George LPN.    ANESTHESIA:  Spinal.    COMPLICATIONS:  None.    ESTIMATED BLOOD LOSS:  1000 mL.    DRAINS:  Clark to gravity.    HISTORY:  Ms. Green is a 33-year-old G1, P0 with intrauterine pregnancy at 37   weeks.  She was in the office and noted to have a nonreactive NST with   oligohydramnios.  She was given Cytotec and Pitocin, but only progressed to 3   cm.  Then, she was having some fetal tachycardia and non-reassuring fetal heart   tones.  She was at a temperature of 101 before the surgery.  She also was   diagnosed with syphilis during this pregnancy, was treated eight weeks ago, but   on ultrasound, the baby appeared to be.    On ultrasound, the baby had shortened fetal long bones and this was discussed   with the patient before the procedure.  After risks and benefits were discussed.    PROCEDURE IN DETAIL:  The patient was taken to the operating room, placed on the   table in dorsal supine position.  Epidural anesthesia was found to be adequate.    After a negative Allis test, a Pfannenstiel skin incision was made with the   scalpel and carried down through the underlying layer of fat to the fascia.  The   fascia was nicked in the midline and the fascial incision was extended   laterally by sharp dissection.  The rectus muscles were  from the   midline and the rectus muscles were  from the fascia.  This  was a   rather dense tissue.  The peritoneum was entered sharply.  The bladder blade was   inserted and the lower uterine segment was incised in a transverse fashion.  A   small amount of fluid was noted.  The infant's head was delivered with the aid   of the vacuum extractor and then the body was removed.  A viable female infant   was delivered.  Apgars were 9 and 9.  The baby's weight was 5 pounds 14 ounces.      The baby was handed off to the waiting pediatrician.  The uterus was   exteriorized and cleared of clots and debris.  The uterus was firm with Pitocin   infusion.  The uterine incision was closed with #1 chromic in a running   interlocking stitch.  One figure-of-eight suture was also placed to achieve   hemostasis.  The ovaries and tubes were visualized and noted to be normal.  The   patient did have a moderate amount of pain at this time.  She was given more   epidural.  The pelvis was irrigated, large clots were removed.  The incision was   inspected.  The uterine incision was then inspected and noted to be hemostatic.    The fascia was closed with #1 Vicryl in a running stitch, bupivacaine was   placed around the fascia and the skin was closed with 3-0 Vicryl in a   subcuticular stitch, and a pressure bandage was applied.  The patient tolerated   the procedure well.  Sponge, lap and needle counts were correct x2, and she was   taken to the Recovery Room awake and alert, in stable condition.  The baby in   stable condition in the nursery.        LGC/HN  dd: 10/19/2019 08:39:02 (CDT)  td: 10/19/2019 08:54:18 (CD)  Doc ID   #[id number]  Job ID #[id number]    CC:

## 2019-10-21 NOTE — PLAN OF CARE
D/c teaching done. Mom stated that she has only pumped 3 times. Discussed benefits of breastmilk for baby; supply/demand; importance of pumping frequently from beginning to establish good milk production. Discussed need for pump at home; different pumps/uses; recommendations. Mom rented symphony pump for 1 month. Paperwork completed & receipts provided. Mom will pump/hand express at least 8+ times/24 hrs for nicu baby. Symphony pump at bs. Reviewed use/cleaning. Stressed importance of hand hygiene & keeping pump kit clean. Will collect, label, store & transport EBM as instructed. Will call for any needs.

## 2019-10-21 NOTE — DISCHARGE INSTRUCTIONS
"Patient Discharge Instructions for Postpartum Women    Resume Regular Diet  Increase activity gradually, no heavy lifting  Shower  No tampons, douching or sexual intercourse.  Discuss birth control options with your physician.  Wear a support bra  Return to work/school when you've been cleared by a physician    Call your physician if     *Fever of 100.4 or higher  *Persistent nausea/ vomiting  *Incisional drainage  *Heavy vaginal bleeding or large clots (Heavy bleeding is soaking 1 pad in an hour)  *Swelling and pain in arms or legs  *Severe headaches, blurred vision or fainting  *Shortness of breath  *Frequency and burning with urination  *Signs of postpartum depression, discuss these signs with your physician    Call lactation services for questions regarding feeding, nipple and breast care, and general questions about lactation.  They can be reached at [phone]         Understanding Postpartum Depression    You've just had a baby.  You know you should be excited and happy.  But instead you find yourself crying for no reason.  You may have trouble coping with your daily tasks.  You feel sad, tired, and hopeless most of the time.  You may even feel ashamed or guilty.  But what you're going through is not your fault and you can feel better.  Talk to your doctor.  He or she can help.    Depression After Childbirth    You may be weepy and tired right after giving birth.  These feelings are normal.  They're sometimes called the "baby blues."  These blues go away 2-3 weeks.  However, postpartum (meaning "after birth") depression lasts much longer and is more sever than the "baby blues."  It can make you feel sad and hopeless.  You may also fear that your baby will be harmed and worry about being a bad mother.      What is Depression?    Depression is a mood disorder that affects the way you think and feel.  The most common symptom is a feeling of deep sadness.  You may also feel as if you just can't cope with life.  "   Other symptoms include:      * Gaining or loosing weight  * Sleeping too much or too little  * Feeling tired all the time  * Feeling restless  * Fears of harming your baby   * Lack of interest in your baby  * Feeling worthless or guilty  * No longer finding pleasure in things you used to  * Having trouble thinking clearly or making decisions  * Thoughts of hurting yourself or your baby    What Causes Postpartum Depression    The exact causes of postpartum depression isn't known.  It may be due to changes in your hormones during and after childbirth.  You may also be tired from caring for your baby and adjusting to being a mother.  All these factors may make you feel depressed.  In some cases, your genes may also play a role.    Depression Can Be Treated    The good news is that there are many ways to treat postpartum depression.  Talking to your doctor is the first step toward feeling better.    Resources:    * National [place] of Mental Health  -- 344.756.1798    www.nimh.nih.gov    * National [place] on Mental Illness --695.118.3125    Www.christina.org    * Mental Health [place] -- 459.662.3837     Www.[facility].org    * National Suicide Hotline --703.480.1591 (800-SUICIDE)    4230-0219 [employer]  All rights reserved.  This information is not intended as a substitute for professional medical care.  Always follow up with your healthcare professional's instructions.        Breastfeeding Discharge Instructions       Feed the baby at the earliest sign of hunger or comfort  o Hands to mouth, sucking motions  o Rooting or searching for something to suck on  o Dont wait for crying - it is a sign of distress     The feedings may be 8-12 times per 24hrs and will not follow a schedule   Avoid pacifiers and bottles for the first 4 weeks   Alternate the breast you start the feeding with, or start with the breast that feels the fullest   Switch breasts when the baby takes himself off the breast or falls  asleep   Keep offering breasts until the baby looks full, no longer gives hunger signs, and stays asleep when placed on his back in the crib   If the baby is sleepy and wont wake for a feeding, put the baby skin-to-skin dressed in a diaper against the mothers bare chest   Sleep near your baby   The baby should be positioned and latched on to the breast correctly  o Chest-to-chest, chin in the breast  o Babys lips are flipped outward  o Babys mouth is stretched open wide like a shout  o Babys sucking should feel like tugging to the mother  - The baby should be drinking at the breast:  o You should hear swallowing or gulping throughout the feeding  o You should see milk on the babys lips when he comes off the breast  o Your breasts should be softer when the baby is finished feeding  o The baby should look relaxed at the end of feedings  o After the 4th day and your milk is in:  o The babys poop should turn bright yellow and be loose, watery, and seedy  o The baby should have at least 3-4 poops the size of the palm of your hand per day  o The baby should have at least 5-6 wet diapers per day  o The urine should be light yellow in color  You should drink when you are thirsty and eat a healthy diet when you are    hungry.     Take naps to get the rest you need.   Take medications and/or drink alcohol only with permission of your obstetrician    or the babys pediatrician.  You can also call the Infant Risk Center,   ([phone]), Monday-Friday, 8am-5pm Central time, to get the most   up-to-date evidence-based information on the use of medications during   pregnancy and breastfeeding.      The baby should be examined by a pediatrician at 3-5 days of age.  Once your   milk comes in, the baby should be gaining at least ½ - 1oz each day and should be back to birthweight no later than 10-14 days of age.          Community Resources    [facility] Breastfeeding Warmline: 171.321.6296  Local  WIC clinics: provide incentives and breastpumps to eligible mothers  La Leche League International (LLLI):  mother-to-mother support group website        www.lll.org  [facility] La Leche League mother-to-mother support groups:        www.lljarettCore Oncology.[employer]        La Leche League [facility]   Dr. Faheem Yoon website for latch videos and general information:        www.breastfeedinginc.ca  [facility] Risk [facility] is a call center that provides information about the safety of taking medications while breastfeeding.  Call 1-821.929.8750, M-F, 8am-5pm, [place].  International Lactation Consultant Association provides resources for assistance:        www.ilca.org  [facility] Breastfeeding Coalition provides informationand resources for parents  and the community    http://[facility].org     Mariza Vanegas is a mom-to-mom support group:                             www.[employer]Thefuture.fm.[employer]//breastfeedng-support/  [employer] for Healthy Babies:  3-084-191-BABY(5689)  [employer]: a breastfeeding support group for women of color, 506.650.9823

## 2019-10-21 NOTE — NURSING
1130 - reviewed discharge instructions and meds w/pt.  D/c meds delivered to pt's room from outpatient pharmacy.  hibiclens given to pt for use at home once dressing is removed.  Pt verbalized understanding of instructions, meds, and hibiclens.  Vss, nad, pain well controlled w/po pain meds, tolerating regular diet, passing gas, dressing c/d/i, pumping frequently while infant remains in nicu.  Pt will call when ready for a wheelchair.

## 2019-10-21 NOTE — LACTATION NOTE
D/c teaching done. Mom stated that she has only pumped 3 times. Discussed benefits of breastmilk for baby; supply/demand; importance of pumping frequently from beginning to establish good milk production. Discussed need for pump at home; different pumps/uses; recommendations. Mom rented symphony pump for 1 month. Paperwork completed & receipts provided. Mom will pump/hand express at least 8+ times/24 hrs for nicu baby. Symphony pump at bs. Reviewed use/cleaning. Stressed importance of hand hygiene & keeping pump kit clean. Will collect, label, store & transport EBM as instructed. Will call for any needs.            [facility]  Lactation Note - Mom    SUMMARY     Maternal Assessment    Breast Size Issue: none  Breast Shape: Bilateral:, pendulous  Breast Density: Bilateral:, soft  Areola: Bilateral:, elastic  Nipples: Bilateral:, everted      LATCH Score         Breasts WDL    Breast WDL: WDL    Maternal Infant Feeding    Maternal Preparation: breast care, hand hygiene  Maternal Emotional State: relaxed  Pain with Feeding: no(with pumping)    Lactation Referrals         Lactation Interventions    Breast Care: Breastfeeding: breast milk to nipples, lanolin to nipples  Breastfeeding Assistance: support offered(with pumping)  Breastfeeding Support: support offered, electric breast pump used, encouragement provided  Breast Care: Breastfeeding: breast milk to nipples, lanolin to nipples  Breastfeeding Assistance: support offered(with pumping)  Breastfeeding Support: diary/feeding log utilized, encouragement provided, lactation counseling provided, maternal hydration promoted, maternal nutrition promoted, maternal rest encouraged       Breastfeeding Session    Breast Pumping Interventions: frequent pumping encouraged    Maternal Information    Person Making Referral: physician(dr barry:baby being transferred  to [facility])  Maternal Reason for Referral: breastfeeding currently, other (see  comments)(NICU BABY)

## 2019-10-21 NOTE — PLAN OF CARE
0815 - vss, nad, pain well controlled w/po pain meds, tolerating regular diet, passing gas, dressing c/d/I.  Poc: encouraged pt to continue pumping frequently 8x or more in 24 hrs while infant remains in NICU, pain management as needed, ambulation and po hydration encouraged, d/c home today.  Reviewed poc w/pt.  Pt verbalized understanding.

## 2019-10-22 NOTE — DISCHARGE SUMMARY
DATE OF ADMISSION:  10/17/2019    DATE OF DISCHARGE:  10/21/2019    HOSPITAL COURSE:  Ms. Green is a 33-year-old G1, P1 who was admitted to the   hospital with intrauterine pregnancy at 37+ weeks.  She was admitted with poor   fetal testing in the office.  She was induced.  She only progressed to 3 cm and   began having non-reassuring fetal heart tones.  She was diagnosed with syphilis   during this pregnancy and has been treated in the nursery, is aware of possible   congenital syphilis.  She failed to progress and delivered a viable female   infant on 10/19/2019.  The baby was stable, but was transferred to [facility] for   antibiotics.  She is stable in the NICU at [facility] for 10 days of penicillin.    The patient is now tolerating a regular diet, ambulating without difficulty.    She has minimal complaints.  She will be discharged in stable condition.  Her   incision is clean, dry and intact under the Aquacel bandage.  She will be   discharged in stable condition.     DISCHARGE DATA:  Discharge to home.    DIAGNOSIS:  Status post primary low transverse  section.  Syphilis   treated.     CONDITION:  Stable.    DIET:  Regular.    ACTIVITY:  Pelvic rest and frequent rest.    MEDICATIONS:  Percocet 5 mg p.o. q. 4 hours p.r.n. pain and Motrin 800 mg p.o.   q. 8 hours p.r.n. pain.  The patient was instructed to follow up in the office   in 2 weeks and to return or call for any problems.      LGC/IN  dd: 10/22/2019 11:56:26 (CDT)  td: 10/22/2019 12:49:44 (CDT)  Doc ID   #[id number]  Job ID #[id number]    CC:

## 2019-10-27 PROCEDURE — 99285 EMERGENCY DEPT VISIT HI MDM: CPT | Mod: 25

## 2019-10-27 PROCEDURE — 93010 ELECTROCARDIOGRAM REPORT: CPT | Mod: ,,, | Performed by: INTERNAL MEDICINE

## 2019-10-27 PROCEDURE — 93010 EKG 12-LEAD: ICD-10-PCS | Mod: ,,, | Performed by: INTERNAL MEDICINE

## 2019-10-27 PROCEDURE — 93005 ELECTROCARDIOGRAM TRACING: CPT

## 2019-10-28 ENCOUNTER — HOSPITAL ENCOUNTER (EMERGENCY)
Facility: HOSPITAL | Age: 34
Discharge: HOME | End: 2019-10-28
Attending: EMERGENCY MEDICINE
Payer: COMMERCIAL

## 2019-10-28 VITALS
HEART RATE: 82 BPM | HEIGHT: 61 IN | TEMPERATURE: 98 F | DIASTOLIC BLOOD PRESSURE: 87 MMHG | RESPIRATION RATE: 18 BRPM | WEIGHT: 214 LBS | BODY MASS INDEX: 40.4 KG/M2 | SYSTOLIC BLOOD PRESSURE: 155 MMHG | OXYGEN SATURATION: 100 %

## 2019-10-28 DIAGNOSIS — R07.9 CHEST PAIN: ICD-10-CM

## 2019-10-28 DIAGNOSIS — M79.89 LEG SWELLING: ICD-10-CM

## 2019-10-28 LAB
ALBUMIN SERPL BCP-MCNC: 2.8 G/DL (ref 3.5–5.2)
ALP SERPL-CCNC: 99 U/L (ref 55–135)
ALT SERPL W/O P-5'-P-CCNC: 12 U/L (ref 10–44)
ANION GAP SERPL CALC-SCNC: 11 MMOL/L (ref 8–16)
AST SERPL-CCNC: 14 U/L (ref 10–40)
B-HCG UR QL: POSITIVE
BASOPHILS # BLD AUTO: 0.02 K/UL (ref 0–0.2)
BASOPHILS NFR BLD: 0.2 % (ref 0–1.9)
BILIRUB SERPL-MCNC: 0.2 MG/DL (ref 0.1–1)
BILIRUB UR QL STRIP: NEGATIVE
BUN SERPL-MCNC: 9 MG/DL (ref 6–20)
CALCIUM SERPL-MCNC: 9.1 MG/DL (ref 8.7–10.5)
CHLORIDE SERPL-SCNC: 105 MMOL/L (ref 95–110)
CLARITY UR: CLEAR
CO2 SERPL-SCNC: 24 MMOL/L (ref 23–29)
COLOR UR: YELLOW
CREAT SERPL-MCNC: 0.6 MG/DL (ref 0.5–1.4)
D DIMER PPP IA.FEU-MCNC: 3.79 MG/L FEU
DIFFERENTIAL METHOD: ABNORMAL
EOSINOPHIL # BLD AUTO: 0.1 K/UL (ref 0–0.5)
EOSINOPHIL NFR BLD: 1.6 % (ref 0–8)
ERYTHROCYTE [DISTWIDTH] IN BLOOD BY AUTOMATED COUNT: 15 % (ref 11.5–14.5)
EST. GFR  (AFRICAN AMERICAN): >60 ML/MIN/1.73 M^2
EST. GFR  (NON AFRICAN AMERICAN): >60 ML/MIN/1.73 M^2
GLUCOSE SERPL-MCNC: 101 MG/DL (ref 70–110)
GLUCOSE UR QL STRIP: NEGATIVE
HCT VFR BLD AUTO: 31.2 % (ref 37–48.5)
HGB BLD-MCNC: 10 G/DL (ref 12–16)
HGB UR QL STRIP: ABNORMAL
KETONES UR QL STRIP: NEGATIVE
LEUKOCYTE ESTERASE UR QL STRIP: NEGATIVE
LYMPHOCYTES # BLD AUTO: 3.1 K/UL (ref 1–4.8)
LYMPHOCYTES NFR BLD: 35.4 % (ref 18–48)
MCH RBC QN AUTO: 27.5 PG (ref 27–31)
MCHC RBC AUTO-ENTMCNC: 32.1 G/DL (ref 32–36)
MCV RBC AUTO: 86 FL (ref 82–98)
MONOCYTES # BLD AUTO: 0.6 K/UL (ref 0.3–1)
MONOCYTES NFR BLD: 6.9 % (ref 4–15)
NEUTROPHILS # BLD AUTO: 4.9 K/UL (ref 1.8–7.7)
NEUTROPHILS NFR BLD: 55.9 % (ref 38–73)
NITRITE UR QL STRIP: NEGATIVE
PH UR STRIP: 6 [PH] (ref 5–8)
PLATELET # BLD AUTO: 498 K/UL (ref 150–350)
PMV BLD AUTO: 9.4 FL (ref 9.2–12.9)
POTASSIUM SERPL-SCNC: 4 MMOL/L (ref 3.5–5.1)
PROT SERPL-MCNC: 6.9 G/DL (ref 6–8.4)
PROT UR QL STRIP: NEGATIVE
RBC # BLD AUTO: 3.64 M/UL (ref 4–5.4)
SODIUM SERPL-SCNC: 140 MMOL/L (ref 136–145)
SP GR UR STRIP: 1.02 (ref 1–1.03)
TROPONIN I SERPL DL<=0.01 NG/ML-MCNC: <0.006 NG/ML (ref 0–0.03)
URN SPEC COLLECT METH UR: ABNORMAL
UROBILINOGEN UR STRIP-ACNC: NEGATIVE EU/DL
WBC # BLD AUTO: 8.86 K/UL (ref 3.9–12.7)

## 2019-10-28 PROCEDURE — 80053 COMPREHEN METABOLIC PANEL: CPT

## 2019-10-28 PROCEDURE — 85025 COMPLETE CBC W/AUTO DIFF WBC: CPT

## 2019-10-28 PROCEDURE — 81025 URINE PREGNANCY TEST: CPT

## 2019-10-28 PROCEDURE — 84484 ASSAY OF TROPONIN QUANT: CPT

## 2019-10-28 PROCEDURE — 25500020 PHARM REV CODE 255: Performed by: EMERGENCY MEDICINE

## 2019-10-28 PROCEDURE — 85379 FIBRIN DEGRADATION QUANT: CPT

## 2019-10-28 PROCEDURE — 81003 URINALYSIS AUTO W/O SCOPE: CPT

## 2019-10-28 RX ADMIN — IOHEXOL 100 ML: 350 INJECTION, SOLUTION INTRAVENOUS at 02:10

## 2019-10-28 NOTE — ED PROVIDER NOTES
Encounter Date: 10/27/2019    SCRIBE #1 NOTE: I, April Grey, am scribing for, and in the presence of,  Dr. Sosa. I have scribed the following portions of the note - Other sections scribed: HPI, ROS, PE.       History     Chief Complaint   Patient presents with    Chest Pain     Reports R upper chest pain. Denies radiation of pain, reports brief nausea earlier today with palpatations. Patient had a baby 1 week ago    Shortness of Breath     SOB w/minimal exertion    Leg Swelling     L leg edema     Lynn Green is a 33 y.o. female who presents to the ED 10 days postpartum. She had  on 10/17/2019 by Dr. Sandra at [facility]. Denies complications during pregnancy or after delivery. She complains of leg swelling to left leg x3days. Reports she was experiencing intermittent stabbing right sided chest pains, nausea (onest yesterday), and shortness of breath today, now all resolved. Describes the feeling as not being able to catch a breath. She reports stress from child being in NICU at [facility] She took Ibuprofen and oxycodone for pain without relief. Denies history of HTN, gestational hypertension, DM, or medical problems.    The history is provided by the patient and medical records. No  was used.     Review of patient's allergies indicates:   Allergen Reactions    Sulfamethoxazole-trimethoprim Hives     Hives (skin)^  Hives (skin)^  Hives (skin)^       Past Medical History:   Diagnosis Date    Allergy     Cystitis     Heart murmur     as a child    History of hematuria     Kidney stones     Kidney stones     Urinary tract infection      Past Surgical History:   Procedure Laterality Date     SECTION N/A 10/19/2019    Procedure:  SECTION;  Surgeon: Hollie Sandra MD;  Location: [facility] L&D;  Service: OB/GYN;  Laterality: N/A;    CYSTOSCOPY       Family History   Problem Relation Age of Onset    Diabetes Mother     Hypertension Father      Prostate cancer Father     Cancer Father         bladder cancer    Breast cancer Neg Hx     Colon cancer Neg Hx     Ovarian cancer Neg Hx     Kidney disease Neg Hx      Social History     Tobacco Use    Smoking status: Never Smoker    Smokeless tobacco: Never Used   Substance Use Topics    Alcohol use: Yes     Comment: socially    Drug use: No     Review of Systems   Constitutional: Negative for chills and fever.   Respiratory: Negative for shortness of breath (resolved).    Cardiovascular: Positive for leg swelling. Negative for chest pain (resolved).   Gastrointestinal: Negative for abdominal pain, nausea (resolved) and vomiting.   Musculoskeletal: Negative for back pain.   Neurological: Negative for weakness and numbness.   All other systems reviewed and are negative.      Physical Exam     Initial Vitals [10/27/19 2302]   BP Pulse Resp Temp SpO2   (!) 158/92 76 (!) 22 98.4 °F (36.9 °C) 98 %      MAP       --         Physical Exam    Nursing note and vitals reviewed.  Constitutional: She appears well-developed and well-nourished. No distress.   HENT:   Head: Normocephalic and atraumatic.   Eyes: Conjunctivae and EOM are normal.   Neck: Normal range of motion. Neck supple.   Cardiovascular: Normal rate, regular rhythm, normal heart sounds and intact distal pulses. Exam reveals no gallop and no friction rub.    No murmur heard.  Pulmonary/Chest: Breath sounds normal. No respiratory distress. She has no wheezes. She has no rhonchi. She has no rales.   Abdominal: Soft. She exhibits no distension. There is no tenderness.   Well healing postsurigcal scar on abdomen.   Musculoskeletal: Normal range of motion. She exhibits edema.   Mild pitting edema to bilateral lower extremities.   Neurological: She is alert and oriented to person, place, and time.   Skin: Skin is warm and dry. No erythema.   Psychiatric: She has a normal mood and affect.         ED Course   Procedures  Labs Reviewed   CBC W/ AUTO DIFFERENTIAL  - Abnormal; Notable for the following components:       Result Value    RBC 3.64 (*)     Hemoglobin 10.0 (*)     Hematocrit 31.2 (*)     RDW 15.0 (*)     Platelets 498 (*)     All other components within normal limits   COMPREHENSIVE METABOLIC PANEL - Abnormal; Notable for the following components:    Albumin 2.8 (*)     All other components within normal limits   URINALYSIS, REFLEX TO URINE CULTURE - Abnormal; Notable for the following components:    Occult Blood UA Trace (*)     All other components within normal limits    Narrative:     Preferred Collection Type->Urine, Clean Catch   PREGNANCY TEST, URINE RAPID - Abnormal; Notable for the following components:    Preg Test, Ur Positive (*)     All other components within normal limits    Narrative:     Preferred Collection Type->Urine, Clean Catch   D DIMER, QUANTITATIVE - Abnormal; Notable for the following components:    D-Dimer 3.79 (*)     All other components within normal limits   TROPONIN I   PREGNANCY TEST, URINE RAPID   D DIMER, QUANTITATIVE     EKG Readings: (Independently Interpreted)   Sinus rhythm with no STEMI       Imaging Results          CTA Chest Non-Coronary (PE Study) (Final result)  Result time 10/28/19 02:56:24    Final result by Nhan Stovall MD (10/28/19 02:56:24)                 Impression:      No acute pulmonary thromboemboli.        Electronically signed by: Nhan Stovall MD  Date:    10/28/2019  Time:    02:56             Narrative:    EXAMINATION:  CTA CHEST NON CORONARY    CLINICAL HISTORY:  Chest pain, acute, PE suspected, intermed prob, positive D-dimer;    TECHNIQUE:  Low dose axial images, sagittal and coronal reformations were obtained from the thoracic inlet to the lung bases following the IV administration of 100 mL of Omnipaque 350.  Contrast timing was optimized to evaluate the pulmonary arteries.  MIP images were performed.    COMPARISON:  None    FINDINGS:    Good contrast bolus opacification of the pulmonary  arteries. No acute pulmonary thromboembolism. No hilar or mediastinal mass or lymphadenopathy. No pleural or pericardial effusion. Lungs are well-expanded and clear with no consolidation, pulmonary nodule or pneumothorax.  Limited images through the upper abdomen are unremarkable.                               X-Ray Chest PA And Lateral (Final result)  Result time 10/28/19 02:21:23    Final result by Nhan Stovall MD (10/28/19 02:21:23)                 Impression:      No acute cardiopulmonary process.      Electronically signed by: Nhan Stovall MD  Date:    10/28/2019  Time:    02:21             Narrative:    EXAMINATION:  XR CHEST PA AND LATERAL    CLINICAL HISTORY:  Chest pain, unspecified    TECHNIQUE:  PA and lateral views of the chest were performed.    COMPARISON:  None.    FINDINGS:  There is no consolidation, effusion, or pneumothorax.    Cardiomediastinal silhouette is unremarkable.    Regional osseous structures are unremarkable.                               US Lower Extremity Veins Bilateral (Final result)  Result time 10/28/19 01:55:29    Final result by Nhan Stovall MD (10/28/19 01:55:29)                 Impression:      No evidence of deep venous thrombosis in either lower extremity.      Electronically signed by: Nhan Stovall MD  Date:    10/28/2019  Time:    01:55             Narrative:    EXAMINATION:  US LOWER EXTREMITY VEINS BILATERAL    CLINICAL HISTORY:  Other specified soft tissue disorders    TECHNIQUE:  Duplex and color flow Doppler and dynamic compression was performed of the bilateral lower extremity veins was performed.    COMPARISON:  None    FINDINGS:  Right thigh veins: The common femoral, femoral, popliteal, upper greater saphenous, and deep femoral veins are patent and free of thrombus. The veins are normally compressible and have normal phasic flow and augmentation response.    Right calf veins: The visualized calf veins are patent.    Left thigh veins: The common  femoral, femoral, popliteal, upper greater saphenous, and deep femoral veins are patent and free of thrombus. The veins are normally compressible and have normal phasic flow and augmentation response.    Left calf veins: The visualized calf veins are patent.    Miscellaneous: None                                 Medical Decision Making:   History:   Old Medical Records: I decided to obtain old medical records.  Initial Assessment:   This is a 33-year-old female, 10 days postpartum status post  done by Dr. Sandra presents the ER for evaluation day of chest pain shortness of breath.  She reports leg swelling, sensation that she could not catch her breath with anxiety and hypertension.  She reports her symptoms have resolved currently and she has no acute distress. She is noted to be hypertensive the once 58 systolic, she denies a history of hypertension, was not hypertensive during her pregnancy.  She reports she is under a lot of stress because the baby is currently in the NICU at [facility].  She is overall well appearing, with minimum leg swelling, no acute distress, lungs were clear on exam.  Differential includes postpartum preeclampsia, DVT/PE, anxiety, pneumonia, infection, electrolyte abnormality.  Will obtain blood work cardiac investigation EKG ultrasound and will reassess.  Clinical Tests:   Lab Tests: Reviewed and Ordered  Radiological Study: Ordered and Reviewed  Medical Tests: Ordered and Reviewed            Scribe Attestation:   Scribe #1: I performed the above scribed service and the documentation accurately describes the services I performed. I attest to the accuracy of the note.    Attending Attestation:           Physician Attestation for Scribe:  Physician Attestation Statement for Scribe #1: I, Dr. Sosa, reviewed documentation, as scribed by April Grey in my presence, and it is both accurate and complete.                 ED Course as of Oct 28 0557   Mon Oct 28, 2019    0226 Resting in bed, no acute distress, labs imaging reviewed, D-dimer elevated, ultrasound negative for DVT.  Will obtain CTA will reassess.    [SE]   0350 Patient resting comfortably in bed, no acute distress, labs and imaging reviewed.  There is no sign of preeclampsia, there is no protein urea noted in blood, there is no sign of DVT or PE, no sign of ACS.  Patient is currently in chest pain and shortness of breath free.  No leukocytosis noted.  EKG stable. I discussed with patient results, I discussed her postpartum hypertension without any signs of end-organ damage.  Patient reports that her  is currently at the NICU at [facility], is causing stress on her life.  I discussed with patient plan to discharge home, close OBGYN/primary care follow-up, return precautions were discussed.  Patient understood and agreed with plan, patient will be discharged.    [SE]      ED Course User Index  [SE] Calra Sosa MD     Clinical Impression:     1. Postpartum hypertension    2. Chest pain    3. Leg swelling                                   Carla Sosa MD  10/28/19 0583

## 2020-07-13 PROBLEM — Z3A.26 26 WEEKS GESTATION OF PREGNANCY: Status: RESOLVED | Noted: 2019-08-06 | Resolved: 2020-07-13

## 2021-01-25 ENCOUNTER — LAB VISIT (OUTPATIENT)
Dept: LAB | Facility: OTHER | Age: 36
End: 2021-01-25
Attending: OBSTETRICS & GYNECOLOGY
Payer: COMMERCIAL

## 2021-01-25 ENCOUNTER — OFFICE VISIT (OUTPATIENT)
Dept: OBSTETRICS AND GYNECOLOGY | Facility: CLINIC | Age: 36
End: 2021-01-25
Attending: OBSTETRICS & GYNECOLOGY
Payer: COMMERCIAL

## 2021-01-25 VITALS
SYSTOLIC BLOOD PRESSURE: 132 MMHG | BODY MASS INDEX: 42.21 KG/M2 | HEIGHT: 61 IN | WEIGHT: 223.56 LBS | DIASTOLIC BLOOD PRESSURE: 84 MMHG

## 2021-01-25 DIAGNOSIS — Z11.51 ENCOUNTER FOR SCREENING FOR HUMAN PAPILLOMAVIRUS (HPV): ICD-10-CM

## 2021-01-25 DIAGNOSIS — Z11.3 SCREENING FOR STD (SEXUALLY TRANSMITTED DISEASE): ICD-10-CM

## 2021-01-25 DIAGNOSIS — Z12.31 ENCOUNTER FOR MAMMOGRAM TO ESTABLISH BASELINE MAMMOGRAM: ICD-10-CM

## 2021-01-25 DIAGNOSIS — Z12.4 ENCOUNTER FOR SCREENING FOR CERVICAL CANCER: ICD-10-CM

## 2021-01-25 DIAGNOSIS — Z01.419 ENCOUNTER FOR GYNECOLOGICAL EXAMINATION: Primary | ICD-10-CM

## 2021-01-25 PROCEDURE — 99395 PR PREVENTIVE VISIT,EST,18-39: ICD-10-PCS | Mod: S$GLB,,, | Performed by: OBSTETRICS & GYNECOLOGY

## 2021-01-25 PROCEDURE — 36415 COLL VENOUS BLD VENIPUNCTURE: CPT

## 2021-01-25 PROCEDURE — 3008F BODY MASS INDEX DOCD: CPT | Mod: CPTII,S$GLB,, | Performed by: OBSTETRICS & GYNECOLOGY

## 2021-01-25 PROCEDURE — 88175 CYTOPATH C/V AUTO FLUID REDO: CPT

## 2021-01-25 PROCEDURE — 3008F PR BODY MASS INDEX (BMI) DOCUMENTED: ICD-10-PCS | Mod: CPTII,S$GLB,, | Performed by: OBSTETRICS & GYNECOLOGY

## 2021-01-25 PROCEDURE — 86703 HIV-1/HIV-2 1 RESULT ANTBDY: CPT

## 2021-01-25 PROCEDURE — 87624 HPV HI-RISK TYP POOLED RSLT: CPT

## 2021-01-25 PROCEDURE — 99999 PR PBB SHADOW E&M-EST. PATIENT-LVL III: CPT | Mod: PBBFAC,,, | Performed by: OBSTETRICS & GYNECOLOGY

## 2021-01-25 PROCEDURE — 99395 PREV VISIT EST AGE 18-39: CPT | Mod: S$GLB,,, | Performed by: OBSTETRICS & GYNECOLOGY

## 2021-01-25 PROCEDURE — 86592 SYPHILIS TEST NON-TREP QUAL: CPT

## 2021-01-25 PROCEDURE — 87340 HEPATITIS B SURFACE AG IA: CPT

## 2021-01-25 PROCEDURE — 99999 PR PBB SHADOW E&M-EST. PATIENT-LVL III: ICD-10-PCS | Mod: PBBFAC,,, | Performed by: OBSTETRICS & GYNECOLOGY

## 2021-01-25 PROCEDURE — 1126F AMNT PAIN NOTED NONE PRSNT: CPT | Mod: S$GLB,,, | Performed by: OBSTETRICS & GYNECOLOGY

## 2021-01-25 PROCEDURE — 1126F PR PAIN SEVERITY QUANTIFIED, NO PAIN PRESENT: ICD-10-PCS | Mod: S$GLB,,, | Performed by: OBSTETRICS & GYNECOLOGY

## 2021-01-26 LAB
HBV SURFACE AG SERPL QL IA: NEGATIVE
HIV 1+2 AB+HIV1 P24 AG SERPL QL IA: NEGATIVE
RPR SER QL: NORMAL

## 2021-01-31 LAB
C TRACH RRNA SPEC QL NAA+PROBE: NEGATIVE
N GONORRHOEA RRNA SPEC QL NAA+PROBE: NEGATIVE

## 2021-02-01 LAB
HPV HR 12 DNA SPEC QL NAA+PROBE: NEGATIVE
HPV16 AG SPEC QL: NEGATIVE
HPV18 DNA SPEC QL NAA+PROBE: NEGATIVE

## 2021-02-06 ENCOUNTER — PATIENT MESSAGE (OUTPATIENT)
Dept: OBSTETRICS AND GYNECOLOGY | Facility: CLINIC | Age: 36
End: 2021-02-06

## 2021-02-08 RX ORDER — NORETHINDRONE ACETATE AND ETHINYL ESTRADIOL 1MG-20(21)
1 KIT ORAL DAILY
Qty: 84 TABLET | Refills: 3 | Status: SHIPPED | OUTPATIENT
Start: 2021-02-08 | End: 2023-01-31

## 2021-02-20 LAB
FINAL PATHOLOGIC DIAGNOSIS: NORMAL
Lab: NORMAL

## 2021-04-26 ENCOUNTER — PATIENT MESSAGE (OUTPATIENT)
Dept: RESEARCH | Facility: HOSPITAL | Age: 36
End: 2021-04-26

## 2022-03-23 ENCOUNTER — OFFICE VISIT (OUTPATIENT)
Dept: URGENT CARE | Facility: CLINIC | Age: 37
End: 2022-03-23
Payer: COMMERCIAL

## 2022-03-23 VITALS
SYSTOLIC BLOOD PRESSURE: 134 MMHG | BODY MASS INDEX: 42.1 KG/M2 | OXYGEN SATURATION: 98 % | HEIGHT: 61 IN | DIASTOLIC BLOOD PRESSURE: 70 MMHG | HEART RATE: 88 BPM | TEMPERATURE: 98 F | RESPIRATION RATE: 20 BRPM | WEIGHT: 223 LBS

## 2022-03-23 DIAGNOSIS — S41.152A OPEN WOUND OF LEFT UPPER ARM DUE TO HUMAN BITE: Primary | ICD-10-CM
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
## 2022-03-23 PROCEDURE — 1159F MED LIST DOCD IN RCRD: CPT | Mod: CPTII,S$GLB,, | Performed by: FAMILY MEDICINE

## 2022-03-23 PROCEDURE — 3078F PR MOST RECENT DIASTOLIC BLOOD PRESSURE < 80 MM HG: ICD-10-PCS | Mod: CPTII,S$GLB,, | Performed by: FAMILY MEDICINE

## 2022-03-23 PROCEDURE — 3008F PR BODY MASS INDEX (BMI) DOCUMENTED: ICD-10-PCS | Mod: CPTII,S$GLB,, | Performed by: FAMILY MEDICINE

## 2022-03-23 PROCEDURE — 99214 OFFICE O/P EST MOD 30 MIN: CPT | Mod: S$GLB,,, | Performed by: FAMILY MEDICINE

## 2022-03-23 PROCEDURE — 99214 PR OFFICE/OUTPT VISIT, EST, LEVL IV, 30-39 MIN: ICD-10-PCS | Mod: S$GLB,,, | Performed by: FAMILY MEDICINE

## 2022-03-23 PROCEDURE — 3075F PR MOST RECENT SYSTOLIC BLOOD PRESS GE 130-139MM HG: ICD-10-PCS | Mod: CPTII,S$GLB,, | Performed by: FAMILY MEDICINE

## 2022-03-23 PROCEDURE — 3075F SYST BP GE 130 - 139MM HG: CPT | Mod: CPTII,S$GLB,, | Performed by: FAMILY MEDICINE

## 2022-03-23 PROCEDURE — 1159F PR MEDICATION LIST DOCUMENTED IN MEDICAL RECORD: ICD-10-PCS | Mod: CPTII,S$GLB,, | Performed by: FAMILY MEDICINE

## 2022-03-23 PROCEDURE — 3078F DIAST BP <80 MM HG: CPT | Mod: CPTII,S$GLB,, | Performed by: FAMILY MEDICINE

## 2022-03-23 PROCEDURE — 3008F BODY MASS INDEX DOCD: CPT | Mod: CPTII,S$GLB,, | Performed by: FAMILY MEDICINE

## 2022-03-23 RX ORDER — AMOXICILLIN AND CLAVULANATE POTASSIUM 875; 125 MG/1; MG/1
1 TABLET, FILM COATED ORAL 2 TIMES DAILY
Qty: 20 TABLET | Refills: 0 | Status: SHIPPED | OUTPATIENT
Start: 2022-03-23 | End: 2022-04-02

## 2022-03-23 RX ORDER — MUPIROCIN 20 MG/G
OINTMENT TOPICAL
Qty: 22 G | Refills: 1 | Status: SHIPPED | OUTPATIENT
Start: 2022-03-23 | End: 2024-01-29
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
## 2022-05-19 LAB
B-HCG UR QL: NEGATIVE
BILIRUB UR QL STRIP: NEGATIVE
CLARITY UR: ABNORMAL
COLOR UR: YELLOW
CTP QC/QA: YES
GLUCOSE UR QL STRIP: NEGATIVE
HGB UR QL STRIP: ABNORMAL
KETONES UR QL STRIP: NEGATIVE
LEUKOCYTE ESTERASE UR QL STRIP: NEGATIVE
NITRITE UR QL STRIP: NEGATIVE
PH UR STRIP: 6 [PH] (ref 5–8)
PROT UR QL STRIP: NEGATIVE
SP GR UR STRIP: 1.02 (ref 1–1.03)
URN SPEC COLLECT METH UR: ABNORMAL
UROBILINOGEN UR STRIP-ACNC: NEGATIVE EU/DL

## 2022-05-19 PROCEDURE — 93005 ELECTROCARDIOGRAM TRACING: CPT

## 2022-05-19 PROCEDURE — 81003 URINALYSIS AUTO W/O SCOPE: CPT | Performed by: NURSE PRACTITIONER

## 2022-05-19 PROCEDURE — 96361 HYDRATE IV INFUSION ADD-ON: CPT

## 2022-05-19 PROCEDURE — 93010 EKG 12-LEAD: ICD-10-PCS | Mod: ,,, | Performed by: INTERNAL MEDICINE

## 2022-05-19 PROCEDURE — 96374 THER/PROPH/DIAG INJ IV PUSH: CPT

## 2022-05-19 PROCEDURE — 93010 ELECTROCARDIOGRAM REPORT: CPT | Mod: ,,, | Performed by: INTERNAL MEDICINE

## 2022-05-19 PROCEDURE — 99285 EMERGENCY DEPT VISIT HI MDM: CPT | Mod: 25

## 2022-05-19 PROCEDURE — 81025 URINE PREGNANCY TEST: CPT | Performed by: NURSE PRACTITIONER

## 2022-05-20 ENCOUNTER — HOSPITAL ENCOUNTER (EMERGENCY)
Facility: HOSPITAL | Age: 37
Discharge: HOME | End: 2022-05-20
Payer: COMMERCIAL

## 2022-05-20 VITALS
OXYGEN SATURATION: 98 % | BODY MASS INDEX: 43.43 KG/M2 | SYSTOLIC BLOOD PRESSURE: 149 MMHG | DIASTOLIC BLOOD PRESSURE: 80 MMHG | WEIGHT: 236 LBS | TEMPERATURE: 98 F | HEIGHT: 62 IN | RESPIRATION RATE: 17 BRPM | HEART RATE: 75 BPM

## 2022-05-20 DIAGNOSIS — R19.7 DIARRHEA, UNSPECIFIED TYPE: ICD-10-CM

## 2022-05-20 DIAGNOSIS — R05.9 COUGH: ICD-10-CM

## 2022-05-20 DIAGNOSIS — R07.9 CHEST PAIN: Primary | ICD-10-CM

## 2022-05-20 LAB
ALBUMIN SERPL BCP-MCNC: 3.8 G/DL (ref 3.5–5.2)
ALP SERPL-CCNC: 79 U/L (ref 55–135)
ALT SERPL W/O P-5'-P-CCNC: 11 U/L (ref 10–44)
ANION GAP SERPL CALC-SCNC: 14 MMOL/L (ref 8–16)
AST SERPL-CCNC: 14 U/L (ref 10–40)
BASOPHILS # BLD AUTO: 0.05 K/UL (ref 0–0.2)
BASOPHILS NFR BLD: 0.6 % (ref 0–1.9)
BILIRUB SERPL-MCNC: 0.2 MG/DL (ref 0.1–1)
BUN SERPL-MCNC: 6 MG/DL (ref 6–20)
CALCIUM SERPL-MCNC: 9.1 MG/DL (ref 8.7–10.5)
CHLORIDE SERPL-SCNC: 104 MMOL/L (ref 95–110)
CO2 SERPL-SCNC: 22 MMOL/L (ref 23–29)
CREAT SERPL-MCNC: 0.6 MG/DL (ref 0.5–1.4)
CTP QC/QA: YES
DIFFERENTIAL METHOD: ABNORMAL
EOSINOPHIL # BLD AUTO: 0.4 K/UL (ref 0–0.5)
EOSINOPHIL NFR BLD: 4 % (ref 0–8)
ERYTHROCYTE [DISTWIDTH] IN BLOOD BY AUTOMATED COUNT: 14.1 % (ref 11.5–14.5)
EST. GFR  (AFRICAN AMERICAN): >60 ML/MIN/1.73 M^2
EST. GFR  (NON AFRICAN AMERICAN): >60 ML/MIN/1.73 M^2
GLUCOSE SERPL-MCNC: 98 MG/DL (ref 70–110)
HCT VFR BLD AUTO: 36.8 % (ref 37–48.5)
HGB BLD-MCNC: 11.6 G/DL (ref 12–16)
IMM GRANULOCYTES # BLD AUTO: 0.01 K/UL (ref 0–0.04)
IMM GRANULOCYTES NFR BLD AUTO: 0.1 % (ref 0–0.5)
INFLUENZA A, MOLECULAR: NEGATIVE
INFLUENZA B, MOLECULAR: NEGATIVE
LIPASE SERPL-CCNC: 20 U/L (ref 4–60)
LYMPHOCYTES # BLD AUTO: 4 K/UL (ref 1–4.8)
LYMPHOCYTES NFR BLD: 45.6 % (ref 18–48)
MCH RBC QN AUTO: 27.5 PG (ref 27–31)
MCHC RBC AUTO-ENTMCNC: 31.5 G/DL (ref 32–36)
MCV RBC AUTO: 87 FL (ref 82–98)
MONOCYTES # BLD AUTO: 0.4 K/UL (ref 0.3–1)
MONOCYTES NFR BLD: 4.5 % (ref 4–15)
NEUTROPHILS # BLD AUTO: 4 K/UL (ref 1.8–7.7)
NEUTROPHILS NFR BLD: 45.2 % (ref 38–73)
NRBC BLD-RTO: 0 /100 WBC
PLATELET # BLD AUTO: 291 K/UL (ref 150–450)
PMV BLD AUTO: 10 FL (ref 9.2–12.9)
POTASSIUM SERPL-SCNC: 3.4 MMOL/L (ref 3.5–5.1)
PROT SERPL-MCNC: 7.7 G/DL (ref 6–8.4)
RBC # BLD AUTO: 4.22 M/UL (ref 4–5.4)
SARS-COV-2 RDRP RESP QL NAA+PROBE: NEGATIVE
SODIUM SERPL-SCNC: 140 MMOL/L (ref 136–145)
SPECIMEN SOURCE: NORMAL
TROPONIN I SERPL DL<=0.01 NG/ML-MCNC: <0.006 NG/ML (ref 0–0.03)
WBC # BLD AUTO: 8.81 K/UL (ref 3.9–12.7)

## 2022-05-20 PROCEDURE — 80053 COMPREHEN METABOLIC PANEL: CPT | Performed by: NURSE PRACTITIONER

## 2022-05-20 PROCEDURE — 25000003 PHARM REV CODE 250: Performed by: STUDENT IN AN ORGANIZED HEALTH CARE EDUCATION/TRAINING PROGRAM

## 2022-05-20 PROCEDURE — 83690 ASSAY OF LIPASE: CPT | Performed by: NURSE PRACTITIONER

## 2022-05-20 PROCEDURE — 84484 ASSAY OF TROPONIN QUANT: CPT | Performed by: NURSE PRACTITIONER

## 2022-05-20 PROCEDURE — U0002 COVID-19 LAB TEST NON-CDC: HCPCS | Performed by: STUDENT IN AN ORGANIZED HEALTH CARE EDUCATION/TRAINING PROGRAM

## 2022-05-20 PROCEDURE — 87502 INFLUENZA DNA AMP PROBE: CPT

## 2022-05-20 PROCEDURE — 25000003 PHARM REV CODE 250

## 2022-05-20 PROCEDURE — 85025 COMPLETE CBC W/AUTO DIFF WBC: CPT | Performed by: NURSE PRACTITIONER

## 2022-05-20 RX ORDER — ACETAMINOPHEN 500 MG
1000 TABLET ORAL
Status: COMPLETED | OUTPATIENT
Start: 2022-05-20 | End: 2022-05-20

## 2022-05-20 RX ORDER — FAMOTIDINE 10 MG/ML
20 INJECTION INTRAVENOUS 2 TIMES DAILY
Status: DISCONTINUED | OUTPATIENT
Start: 2022-05-20 | End: 2022-05-20

## 2022-05-20 RX ORDER — MAG HYDROX/ALUMINUM HYD/SIMETH 200-200-20
30 SUSPENSION, ORAL (FINAL DOSE FORM) ORAL
Status: COMPLETED | OUTPATIENT
Start: 2022-05-20 | End: 2022-05-20

## 2022-05-20 RX ORDER — FAMOTIDINE 10 MG/ML
20 INJECTION INTRAVENOUS ONCE
Status: COMPLETED | OUTPATIENT
Start: 2022-05-20 | End: 2022-05-20

## 2022-05-20 RX ADMIN — SODIUM CHLORIDE 1000 ML: 0.9 INJECTION, SOLUTION INTRAVENOUS at 01:05

## 2022-05-20 RX ADMIN — ACETAMINOPHEN 1000 MG: 500 TABLET ORAL at 01:05

## 2022-05-20 RX ADMIN — FAMOTIDINE 20 MG: 10 INJECTION, SOLUTION INTRAVENOUS at 01:05

## 2022-05-20 RX ADMIN — ALUMINUM HYDROXIDE, MAGNESIUM HYDROXIDE, AND SIMETHICONE 30 ML: 200; 200; 20 SUSPENSION ORAL at 01:05

## 2022-05-20 NOTE — FIRST PROVIDER EVALUATION
Emergency Department TeleTriage Encounter Note      CHIEF COMPLAINT    Chief Complaint   Patient presents with    Chest Pain    Shortness of Breath    Diarrhea     C/o intermittent, sharp mid-sternal chest pain w/ SOB on exertion and diarrhea x 2 days. Denies nausea, vomiting. Reports one episode of dizziness earlier today. Denies cardiac hx.       VITAL SIGNS   Initial Vitals [05/19/22 2048]   BP Pulse Resp Temp SpO2   137/84 84 20 98.6 °F (37 °C) 100 %      MAP       --            ALLERGIES    Review of patient's allergies indicates:   Allergen Reactions    Sulfamethoxazole-trimethoprim Hives     Hives (skin)^  Hives (skin)^  Hives (skin)^         PROVIDER TRIAGE NOTE  TeleTriage Note: Lynn Green, a nontoxic/well appearing, 36 y.o. female, presented to the ED with c/o chest pain and SOB that began 2 days. Not currently having any pain.     All ED beds are full at present; patient notified of this status.  Patient seen and medically screened by Nurse Practitioner via teletriage. Orders initiated at triage to expedite care.  Patient is stable to return to the waiting room and will be placed in an ED bed when available.  Care will be transferred to an alternate provider when patient has been placed in an Exam Room from the [facility] for physical exam, additional orders, and disposition.  9:16 PM Jannet Leavitt DNP, FNP-C        ORDERS  Labs Reviewed   CBC W/ AUTO DIFFERENTIAL   COMPREHENSIVE METABOLIC PANEL   LIPASE   URINALYSIS, REFLEX TO URINE CULTURE   TROPONIN I   POCT URINE PREGNANCY       ED Orders (720h ago, onward)    Start Ordered     Status Ordering Provider    05/19/22 2118 05/19/22 2117  Vital signs  Every 2 hours         Ordered JANNET LEAVITT    05/19/22 2118 05/19/22 2117  Troponin I  STAT         Ordered JANNET LEAVITT    05/19/22 2118 05/19/22 2117  POCT urine pregnancy  Once         Ordered JANNET LEAVITT    05/19/22 2118 05/19/22 2117  EKG 12-lead  Once         Ordered DAGMAR  NATIVIDAD MEdwin    05/19/22 2118 05/19/22 2117  X-Ray Chest PA And Lateral  1 time imaging         Ordered DAGMAR, NATIVIDAD MEdwin    05/19/22 2117 05/19/22 2117  Diet NPO  Diet effective now         Ordered DAGMAR, NATIVIDAD KASPEREdwin    05/19/22 2117 05/19/22 2117  Insert peripheral IV  Once         Ordered DAGMAR, NATIVIDAD MEdwin    05/19/22 2117 05/19/22 2117  CBC W/ AUTO DIFFERENTIAL  STAT         Ordered DAGMAR, NATIVIDAD PFEIFFER    05/19/22 2117 05/19/22 2117  Comp. Metabolic Panel  STAT         Ordered DAGMAR, NATIVIDAD MAJOEdwin    05/19/22 2117 05/19/22 2117  Lipase  STAT         Ordered DAGMAR, NATIVIDAD KASPEREdwin    05/19/22 2117 05/19/22 2117  Urinalysis, Reflex to Urine Culture Urine, Clean Catch  STAT         Ordered DAGMAR, NATIVIDAD PFEIFFER            Virtual Visit Note: The provider triage portion of this emergency department evaluation and documentation was performed via [employer], a HIPAA-compliant telemedicine application, in concert with a tele-presenter in the room. A face to face patient evaluation with one of my colleagues will occur once the patient is placed in an emergency department room.      DISCLAIMER: This note was prepared with [employer] voice recognition transcription software. Garbled syntax, mangled pronouns, and other bizarre constructions may be attributed to that software system.

## 2022-05-20 NOTE — ED PROVIDER NOTES
Encounter Date: 2022       History     Chief Complaint   Patient presents with    Chest Pain    Shortness of Breath    Diarrhea     C/o intermittent, sharp mid-sternal chest pain w/ SOB on exertion and diarrhea x 2 days. Denies nausea, vomiting. Reports one episode of dizziness earlier today. Denies cardiac hx.     28-year-old female seasonal allergies, history of kidney stones presents for midsternal sharp chest pain for the past few days.  Patient states the pain comes and goes randomly throughout the day, with no clear exacerbating factors.  Patient has not tried anything to alleviate her symptoms as they typically resolved spontaneously.  Patient also reports some cough and shortness of breath.  She has had a few episodes of nonbloody diarrhea.  She had a transient episode of lightheadedness earlier. Patient denies abdominal pain, nausea/vomiting.  Patient denies any history of similar chest pain.  Patient denies leg swelling/pain, history of blood clots, recent travel/surgery, hormonal therapy    The history is provided by the patient and medical records.     Review of patient's allergies indicates:   Allergen Reactions    Sulfamethoxazole-trimethoprim Hives     Hives (skin)^  Hives (skin)^  Hives (skin)^       Past Medical History:   Diagnosis Date    Allergy     Cystitis     Heart murmur     as a child    History of hematuria     Kidney stones     Kidney stones     Positive RPR test 10/18/2019    Urinary tract infection      Past Surgical History:   Procedure Laterality Date     SECTION N/A 10/19/2019    Procedure:  SECTION;  Surgeon: Hollie Sandra MD;  Location: [facility] L&D;  Service: OB/GYN;  Laterality: N/A;    CYSTOSCOPY       Family History   Problem Relation Age of Onset    Diabetes Mother     Hypertension Mother     Hypertension Father     Prostate cancer Father     Cancer Father         bladder cancer    Colon cancer Father     Hypertension Sister      Breast cancer Neg Hx     Ovarian cancer Neg Hx     Kidney disease Neg Hx     Stroke Neg Hx      Social History     Tobacco Use    Smoking status: Never Smoker    Smokeless tobacco: Never Used   Substance Use Topics    Alcohol use: Yes     Comment: socially    Drug use: No     Review of Systems   Constitutional: Negative for chills and fever.   HENT: Negative for rhinorrhea and sore throat.    Eyes: Negative for photophobia and visual disturbance.   Respiratory: Positive for cough and shortness of breath.    Cardiovascular: Positive for chest pain. Negative for palpitations.   Gastrointestinal: Negative for nausea and vomiting.   Genitourinary: Negative for difficulty urinating and dysuria.   Musculoskeletal: Negative for back pain and myalgias.   Skin: Negative for pallor and rash.   Neurological: Positive for light-headedness. Negative for weakness and numbness.   Psychiatric/Behavioral: Negative for agitation and confusion.       Physical Exam     Initial Vitals [05/19/22 2048]   BP Pulse Resp Temp SpO2   137/84 84 20 98.6 °F (37 °C) 100 %      MAP       --         Physical Exam    Nursing note and vitals reviewed.  Constitutional:   Alert, ambulatory, speaking full sentences, no acute distress   HENT:   Head: Normocephalic and atraumatic.   Mouth/Throat: Oropharynx is clear and moist.   Eyes: Conjunctivae and EOM are normal.   Cardiovascular: Normal rate, regular rhythm, normal heart sounds and intact distal pulses.   Pulmonary/Chest: Breath sounds normal. No stridor. No respiratory distress.   Abdominal: Abdomen is soft. She exhibits no distension. There is no abdominal tenderness.   Musculoskeletal:         General: No tenderness or edema.     Neurological: She is alert and oriented to person, place, and time. She has normal strength.   Skin: Skin is warm and dry. No rash noted.   Psychiatric: She has a normal mood and affect. Thought content normal.         ED Course   Procedures  Labs Reviewed   CBC W/  AUTO DIFFERENTIAL - Abnormal; Notable for the following components:       Result Value    Hemoglobin 11.6 (*)     Hematocrit 36.8 (*)     MCHC 31.5 (*)     All other components within normal limits   URINALYSIS, REFLEX TO URINE CULTURE - Abnormal; Notable for the following components:    Appearance, UA Hazy (*)     Occult Blood UA Trace (*)     All other components within normal limits    Narrative:     Specimen Source->Urine   INFLUENZA A & B BY MOLECULAR   COMPREHENSIVE METABOLIC PANEL   LIPASE   TROPONIN I   POCT URINE PREGNANCY   SARS-COV-2 RDRP GENE     EKG Readings: (Independently Interpreted)   Sinus rhythm, regular, narrow complex, rate of 76, nonspecific ST changes in III, overall unchanged compared to previous       Imaging Results          X-Ray Chest PA And Lateral (Final result)  Result time 05/19/22 21:57:07    Final result by Romie Rebollar DO (05/19/22 21:57:07)                 Impression:      No acute cardiopulmonary abnormality.      Electronically signed by: Romie Rebollar  Date:    05/19/2022  Time:    21:57             Narrative:    EXAMINATION:  XR CHEST PA AND LATERAL    CLINICAL HISTORY:  Chest pain, unspecified    TECHNIQUE:  PA and lateral views of the chest were performed.    COMPARISON:  10/20/2019.    FINDINGS:  The lungs are well expanded and clear. No focal opacities are seen. The pleural spaces are clear.    The cardiac silhouette is unremarkable.    The visualized osseous structures are unremarkable.                                 Medications   sodium chloride 0.9% bolus 1,000 mL (1,000 mLs Intravenous New Bag 5/20/22 0102)   aluminum-magnesium hydroxide-simethicone 200-200-20 mg/5 mL suspension 30 mL (30 mLs Oral Given 5/20/22 0103)   acetaminophen tablet 1,000 mg (1,000 mg Oral Given 5/20/22 0103)   famotidine (PF) injection 20 mg (20 mg Intravenous Given 5/20/22 0103)     Medical Decision Making:   History:   Old Medical Records: I decided to obtain old medical records.  Old  Records Summarized: records from clinic visits and records from previous admission(s).  Initial Assessment:   28-year-old female seasonal allergies, history of kidney stones presents for midsternal chest pain, shortness of breath, and diarrhea  Independently Interpreted Test(s):   I have ordered and independently interpreted EKG Reading(s) - see prior notes  Clinical Tests:   Lab Tests: Ordered and Reviewed  Radiological Study: Ordered and Reviewed  Medical Tests: Ordered and Reviewed  ED Management:  Patient without hypoxia or respiratory distress  EKG without evidence of active ischemia  Suspect source of chest pain is musculoskeletal versus pleuritic  Also include GERD, viral illness, COVID, pneumonia, less likely ACS, doubt  After consideration of age, risk factors, features of presentation, troponin was ordered  Patient meets PERC criteria to effectively rule out PE  Pain is not reproducible with movement, palpation  Tylenol, pepcid, maalox given             ED Course as of 05/20/22 0148   Fri May 20, 2022   0038 Preg Test, Ur: Negative [OK]   0038 X-Ray Chest PA And Lateral  No acute findings [OK]   0120 SARS-CoV-2 RNA, Amplification, Qual: Negative [OK]   0143 No further episodes of chest pain, patient asymptomatic currently [OK]   0147 If workup otherwise unremarkable, will discharge with symptomatic management, PCP follow-up, return precautions [OK]      ED Course User Index  [OK] Bang Levine MD             Clinical Impression:   Final diagnoses:  [R07.9] Chest pain (Primary)  [R19.7] Diarrhea, unspecified type  [R05.9] Cough                  Bang Levine MD  Resident  05/20/22 0230

## 2022-05-20 NOTE — DISCHARGE INSTRUCTIONS
For fever/pain use:   Tylenol = Acetaminophen every 6hrs as needed for fever or pain  Motrin = Ibuprofen every 6hrs as needed for fever or pain  You can alternate the two medications every 3hrs     You can use saline nasal drops over the counter for your symptoms. If this does not help, you can try over the counter cold medicine that contains acetaminophen, ibuprofen, pseudoephedrine, or phenylephrine (found in most common cold medications). If you are taking these combination cold medications, do not take tylenol/ibuprofen at the same time, as they already contain these ingredients.    For sore throat, you can also use:  Oral hydration and warm fluids (eg, tea, chicken soup)  Honey (2.5 to 5 mL [0.5 to 1 teaspoon]) can be given straight or diluted in liquid (eg, tea, juice). Corn syrup may be substituted if honey is not available.  Hard candy or lozenges for children over six years of age     Avoid codeine or other antitussive agents (eg, dextromethorphan) for the treatment of cold-related cough  Follow up with your primary care provider, call as soon as possible to schedule follow up appointment in the next 3-4 days.  Return to the emergency department if you develop any tongue swelling, trouble breathing, or any other concerning symptoms

## 2023-01-31 ENCOUNTER — OFFICE VISIT (OUTPATIENT)
Dept: OBSTETRICS AND GYNECOLOGY | Facility: CLINIC | Age: 38
End: 2023-01-31
Attending: STUDENT IN AN ORGANIZED HEALTH CARE EDUCATION/TRAINING PROGRAM
Payer: COMMERCIAL

## 2023-01-31 VITALS
WEIGHT: 231.5 LBS | DIASTOLIC BLOOD PRESSURE: 90 MMHG | SYSTOLIC BLOOD PRESSURE: 130 MMHG | BODY MASS INDEX: 42.6 KG/M2 | HEIGHT: 62 IN

## 2023-01-31 DIAGNOSIS — Z01.419 WELL WOMAN EXAM: ICD-10-CM

## 2023-01-31 DIAGNOSIS — R10.2 PELVIC PAIN: Primary | ICD-10-CM

## 2023-01-31 PROCEDURE — 99999 PR PBB SHADOW E&M-EST. PATIENT-LVL III: CPT | Mod: PBBFAC,,, | Performed by: STUDENT IN AN ORGANIZED HEALTH CARE EDUCATION/TRAINING PROGRAM

## 2023-01-31 PROCEDURE — 99395 PR PREVENTIVE VISIT,EST,18-39: ICD-10-PCS | Mod: S$GLB,,, | Performed by: STUDENT IN AN ORGANIZED HEALTH CARE EDUCATION/TRAINING PROGRAM

## 2023-01-31 PROCEDURE — 99999 PR PBB SHADOW E&M-EST. PATIENT-LVL III: ICD-10-PCS | Mod: PBBFAC,,, | Performed by: STUDENT IN AN ORGANIZED HEALTH CARE EDUCATION/TRAINING PROGRAM

## 2023-01-31 PROCEDURE — 1159F MED LIST DOCD IN RCRD: CPT | Mod: CPTII,S$GLB,, | Performed by: STUDENT IN AN ORGANIZED HEALTH CARE EDUCATION/TRAINING PROGRAM

## 2023-01-31 PROCEDURE — 99395 PREV VISIT EST AGE 18-39: CPT | Mod: S$GLB,,, | Performed by: STUDENT IN AN ORGANIZED HEALTH CARE EDUCATION/TRAINING PROGRAM

## 2023-01-31 PROCEDURE — 3080F PR MOST RECENT DIASTOLIC BLOOD PRESSURE >= 90 MM HG: ICD-10-PCS | Mod: CPTII,S$GLB,, | Performed by: STUDENT IN AN ORGANIZED HEALTH CARE EDUCATION/TRAINING PROGRAM

## 2023-01-31 PROCEDURE — 3080F DIAST BP >= 90 MM HG: CPT | Mod: CPTII,S$GLB,, | Performed by: STUDENT IN AN ORGANIZED HEALTH CARE EDUCATION/TRAINING PROGRAM

## 2023-01-31 PROCEDURE — 3075F SYST BP GE 130 - 139MM HG: CPT | Mod: CPTII,S$GLB,, | Performed by: STUDENT IN AN ORGANIZED HEALTH CARE EDUCATION/TRAINING PROGRAM

## 2023-01-31 PROCEDURE — 3075F PR MOST RECENT SYSTOLIC BLOOD PRESS GE 130-139MM HG: ICD-10-PCS | Mod: CPTII,S$GLB,, | Performed by: STUDENT IN AN ORGANIZED HEALTH CARE EDUCATION/TRAINING PROGRAM

## 2023-01-31 PROCEDURE — 3008F BODY MASS INDEX DOCD: CPT | Mod: CPTII,S$GLB,, | Performed by: STUDENT IN AN ORGANIZED HEALTH CARE EDUCATION/TRAINING PROGRAM

## 2023-01-31 PROCEDURE — 1159F PR MEDICATION LIST DOCUMENTED IN MEDICAL RECORD: ICD-10-PCS | Mod: CPTII,S$GLB,, | Performed by: STUDENT IN AN ORGANIZED HEALTH CARE EDUCATION/TRAINING PROGRAM

## 2023-01-31 PROCEDURE — 3008F PR BODY MASS INDEX (BMI) DOCUMENTED: ICD-10-PCS | Mod: CPTII,S$GLB,, | Performed by: STUDENT IN AN ORGANIZED HEALTH CARE EDUCATION/TRAINING PROGRAM

## 2023-01-31 NOTE — PROGRESS NOTES
Chief Complaint: Well Woman Exam     HPI:      Lynn Green is a 37 y.o.  who presents today for well woman exam.  LMP: Patient's last menstrual period was 2023.  Does have some pelvic pain and cramping.  Trouble losing weight. No other issues, problems, or complaints. Specifically, patient denies abnormal vaginal bleeding, discharge, pelvic pain, urinary problems, or changes in appetite.  Declines contraception.    Previous Pap: 2021 no abnormalities, hpv neg     OB History          1    Para   1    Term   1            AB        Living   1         SAB        IAB        Ectopic        Multiple   0    Live Births   1               Past Medical History:   Diagnosis Date    Allergy     Cystitis     Heart murmur     as a child    History of hematuria     Kidney stones     Kidney stones     Positive RPR test 10/18/2019    Urinary tract infection      Past Surgical History:   Procedure Laterality Date     SECTION N/A 10/19/2019    Procedure:  SECTION;  Surgeon: Hollie Sandra MD;  Location: [facility] L&D;  Service: OB/GYN;  Laterality: N/A;    CYSTOSCOPY       Social History     Socioeconomic History    Marital status: Single   Occupational History     Employer: [employer]   Tobacco Use    Smoking status: Never    Smokeless tobacco: Never   Substance and Sexual Activity    Alcohol use: Yes     Comment: socially    Drug use: No    Sexual activity: Yes     Partners: Male     Birth control/protection: None, Condom     Comment: single     Family History   Problem Relation Age of Onset    Diabetes Mother     Hypertension Mother     Hypertension Father     Prostate cancer Father     Cancer Father         bladder cancer    Colon cancer Father     Hypertension Sister     Breast cancer Neg Hx     Ovarian cancer Neg Hx     Kidney disease Neg Hx     Stroke Neg Hx        Current Outpatient Medications:     MULTIVITAMIN ORAL, Take 1 tablet by mouth., Disp: , Rfl:      "acetaminophen (TYLENOL ORAL), Take by mouth., Disp: , Rfl:     mupirocin (BACTROBAN) 2 % ointment, Apply to affected area 3 times daily (Patient not taking: Reported on 2023), Disp: 22 g, Rfl: 1    ROS:     GENERAL: Denies unintentional weight gain or weight loss. Feeling well overall.   SKIN: Denies rash or lesions.   HEENT: Denies headaches, or vision changes.   CARDIOVASCULAR: Denies palpitations or chest pain.   RESPIRATORY: Denies shortness of breath or dyspnea on exertion.  BREASTS: Denies pain, lumps, or nipple discharge.   ABDOMEN: Denies abdominal pain, constipation, diarrhea, nausea, vomiting, change in appetite.  URINARY: Denies frequency, dysuria, hematuria.  NEUROLOGIC: Denies syncope or weakness.   PSYCHIATRIC: Denies depression, anxiety or mood swings.    Physical Exam:      PHYSICAL EXAM:  BP (!) 130/90   Ht 5' 2" (1.575 m)   Wt 105 kg (231 lb 7.7 oz)   LMP 2023   BMI 42.34 kg/m²   Body mass index is 42.34 kg/m².     APPEARANCE: Well nourished, well developed, in no acute distress.  PSYCH: Appropriate mood and affect.  SKIN: No acne or hirsutism.  NECK: Neck symmetric without masses or thyromegaly.  NODES: No inguinal, axillary, or supraclavicular lymph node enlargement.  BREASTS: Symmetrical, no skin changes or visible lesions.  No palpable masses or nipple discharge bilaterally.  ABDOMEN: Soft.  No tenderness or masses.    PELVIC: Normal external genitalia without lesions.  Normal hair distribution.  Adequate perineal body, normal urethral meatus.  Vagina moist and well rugated without lesions or discharge.  Cervix pink, without lesions, discharge or tenderness.  No significant cystocele or rectocele.  Bimanual exam shows uterus to be normal size, regular, mobile and nontender.  Adnexa without masses or tenderness.    EXTREMITIES: No edema.  No tenderness to palpation.  Declines upt  Assessment/Plan:     37 y.o.     Pelvic pain  -     US Pelvis Comp with Transvag NON-OB (xpd; " Future; Expected date: 2023    Well woman exam          Counselin.  Annual exam performed today without difficulty.  Patient was counseled today on current ASCCP pap guidelines, the recommendation for yearly pelvic exams, healthy diet and exercise routines, breast self awareness.  Pap smear utd.  All questions answered.    2.  Contraception:  declines.  3.  U/s.  4.  Follow up with PCP for other health maintenance.  5.  Follow up in about 1 year (around 2024).      Use of the [facility] Patient Portal discussed and encouraged during today's visit.

## 2023-02-01 ENCOUNTER — HOSPITAL ENCOUNTER (EMERGENCY)
Facility: HOSPITAL | Age: 38
Discharge: HOME | End: 2023-02-01
Attending: STUDENT IN AN ORGANIZED HEALTH CARE EDUCATION/TRAINING PROGRAM
Payer: COMMERCIAL

## 2023-02-01 VITALS
HEIGHT: 62 IN | BODY MASS INDEX: 42.34 KG/M2 | RESPIRATION RATE: 20 BRPM | DIASTOLIC BLOOD PRESSURE: 80 MMHG | OXYGEN SATURATION: 100 % | HEART RATE: 87 BPM | TEMPERATURE: 98 F | SYSTOLIC BLOOD PRESSURE: 131 MMHG

## 2023-02-01 DIAGNOSIS — S30.860A INSECT BITE OF LOWER BACK, INITIAL ENCOUNTER: Primary | ICD-10-CM
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
## 2023-02-01 PROCEDURE — 99283 EMERGENCY DEPT VISIT LOW MDM: CPT

## 2023-02-01 RX ORDER — BACITRACIN ZINC 500 UNIT/G
OINTMENT (GRAM) TOPICAL 2 TIMES DAILY
Qty: 30 G | Refills: 0 | Status: SHIPPED | OUTPATIENT
Start: 2023-02-01 | End: 2024-01-29

## 2023-02-01 NOTE — Clinical Note
"Lynn Waters" Peter was seen and treated in our emergency department on 2/1/2023.  She may return to work on 02/03/2023.       If you have any questions or concerns, please don't hesitate to call.      Beata Blel RN    "

## 2023-02-02 NOTE — DISCHARGE INSTRUCTIONS
You may take tylenol and motrin as directed on the labeling for discomfort.     You may also use topical benadryl and hydrocortisone cream as directed on the labeling.     Return to the ED if your condition changes, progresses, or if you have any concerns.      Thank you for choosing [facility]! We appreciate you coming to us for your medical care. We hope you feel better soon! Please come back to [facility] for all of your future medical needs.      Our goal in the emergency department is to always give you outstanding care and exceptional service. You may receive a survey by mail or e-mail in the next week regarding your experience in our ED. We would greatly appreciate your completing and returning the survey. Your feedback provides us with a way to recognize our staff who give very good care and it helps us learn how to improve when your experience was below our aspiration of excellence.      Sincerely,  PRAVIN Jefferson  [facility] [place] [facility]

## 2023-02-02 NOTE — ED PROVIDER NOTES
Encounter Date: 2023       History     Chief Complaint   Patient presents with    Insect Bite     Spider bite happened today on right back side complaints of pain and soreness     37-year-old female presents to the ED for an insect bite.  The patient reports that the insect bite happened today.  It is located on her right flank area.  She states the area hurts.  She is unsure what insect bit her.  She has tried no intervention for her symptoms.  No other complaints at this time.    The history is provided by the patient.   Review of patient's allergies indicates:   Allergen Reactions    Sulfamethoxazole-trimethoprim Hives     Hives (skin)^  Hives (skin)^  Hives (skin)^       Past Medical History:   Diagnosis Date    Allergy     Cystitis     Heart murmur     as a child    History of hematuria     Kidney stones     Kidney stones     Positive RPR test 10/18/2019    Urinary tract infection      Past Surgical History:   Procedure Laterality Date     SECTION N/A 10/19/2019    Procedure:  SECTION;  Surgeon: Hollie Sandra MD;  Location: [facility] L&D;  Service: OB/GYN;  Laterality: N/A;    CYSTOSCOPY       Family History   Problem Relation Age of Onset    Diabetes Mother     Hypertension Mother     Hypertension Father     Prostate cancer Father     Cancer Father         bladder cancer    Colon cancer Father     Hypertension Sister     Breast cancer Neg Hx     Ovarian cancer Neg Hx     Kidney disease Neg Hx     Stroke Neg Hx      Social History     Tobacco Use    Smoking status: Never    Smokeless tobacco: Never   Substance Use Topics    Alcohol use: Yes     Comment: socially    Drug use: No     Review of Systems   Constitutional:  Negative for fever.   HENT:  Negative for sore throat.    Gastrointestinal:  Negative for vomiting.   Skin:  Positive for color change. Negative for rash.   Allergic/Immunologic: Negative for immunocompromised state.   Neurological:  Negative for weakness and headaches.    Psychiatric/Behavioral:  Negative for confusion.    All other systems reviewed and are negative.    Physical Exam     Initial Vitals [02/01/23 1845]   BP Pulse Resp Temp SpO2   131/80 87 20 98 °F (36.7 °C) 100 %      MAP       --         Physical Exam    Nursing note and vitals reviewed.  Constitutional: Vital signs are normal. She appears well-developed and well-nourished. She is active and cooperative.  Non-toxic appearance. She does not have a sickly appearance. She does not appear ill.   HENT:   Head: Normocephalic and atraumatic.   Eyes: Conjunctivae and EOM are normal.   Neck: Neck supple.   Normal range of motion.  Cardiovascular:  Normal rate.           Pulmonary/Chest: Effort normal.   Comfortable work of breathing. Pt speaking in full sentences.    Musculoskeletal:      Cervical back: Normal range of motion and neck supple.     Neurological: She is alert and oriented to person, place, and time. She has normal strength. GCS eye subscore is 4. GCS verbal subscore is 5. GCS motor subscore is 6.   Skin: Skin is warm, dry and intact. Capillary refill takes less than 2 seconds. No bruising, no ecchymosis, no lesion, no rash and no abscess noted. There is erythema.   2 cm annular area of erythema to her right flank area.  There is a centralized raised papule.  No vesicle, pustule, or drainage.   Psychiatric: She has a normal mood and affect. Her speech is normal and behavior is normal. Judgment and thought content normal. Cognition and memory are normal.       ED Course   Procedures  Labs Reviewed   POCT URINE PREGNANCY          Imaging Results    None          Medications - No data to display  Medical Decision Making:   Initial Assessment:   37-year-old female presents to ED for an insect bite to her right flank area that occurred today.  She is well appearing.  Afebrile.  Her vitals are stable.  She has a 2 cm area of erythema to her right flank area.  No drainage.  No fluctuance.  Differential Diagnosis:    Insect bite, allergic reaction, contact dermatitis, cellulitis  ED Management:  No indication for labs or imaging.  She has an insect bite to her right flank area.  Encouraged supportive care. No need for I&D at this time.     Pt to follow up with PCP within 7 days.  I reviewed strict return precautions. In addition, pt is to return to the ED if condition changes, progresses, or if there are any concerns.  Pt verbalized understanding, compliance, and agreement with the treatment plan.                            Clinical Impression:   Final diagnoses:  [S30.860A, W57.XXXA] Insect bite of lower back, initial encounter (Primary)        ED Disposition Condition    Discharge Stable          ED Prescriptions       Medication Sig Dispense Start Date End Date Auth. Provider    bacitracin 500 unit/gram Oint Apply topically 2 (two) times daily. 30 g 2/1/2023 -- CIRILO Sanders          Follow-up Information       Follow up With Specialties Details Why Contact Info    Your doctor  Schedule an appointment as soon as possible for a visit in 1 week               CIRILO Sanders  02/01/23 2002

## 2023-02-02 NOTE — FIRST PROVIDER EVALUATION
Emergency Department TeleTriage Encounter Note      CHIEF COMPLAINT    Chief Complaint   Patient presents with    Insect Bite     Spider bite happened today on right back side complaints of pain and soreness       VITAL SIGNS   Initial Vitals [02/01/23 1845]   BP Pulse Resp Temp SpO2   131/80 87 20 98 °F (36.7 °C) 100 %      MAP       --            ALLERGIES    Review of patient's allergies indicates:   Allergen Reactions    Sulfamethoxazole-trimethoprim Hives     Hives (skin)^  Hives (skin)^  Hives (skin)^         PROVIDER TRIAGE NOTE  TeleTriage Note: Lynn Green, a nontoxic/well appearing, 37 y.o. female, presented to the ED with c/o right thigh/hip spider bite that occurred earlier today. Didn't see the spider but felt a sharp pain.     All ED beds are full at present; patient notified of this status.  Patient seen and medically screened by Nurse Practitioner via teletriage. Orders initiated at triage to expedite care.  Patient is stable to return to the waiting room and will be placed in an ED bed when available.  Care will be transferred to an alternate provider when patient has been placed in an Exam Room from the [facility] for physical exam, additional orders, and disposition.  7:26 PM Jannet Leavitt DNP, FNP-C        ORDERS  Labs Reviewed   POCT URINE PREGNANCY       ED Orders (720h ago, onward)      Start Ordered     Status Ordering Provider    02/01/23 1928 02/01/23 1927  POCT urine pregnancy  Once         Ordered JANNET LEAVITT              Virtual Visit Note: The provider triage portion of this emergency department evaluation and documentation was performed via [employer], a HIPAA-compliant telemedicine application, in concert with a tele-presenter in the room. A face to face patient evaluation with one of my colleagues will occur once the patient is placed in an emergency department room.      DISCLAIMER: This note was prepared with M*Modal voice recognition transcription software. Nona  syntax, mangled pronouns, and other bizarre constructions may be attributed to that software system.

## 2023-02-25 ENCOUNTER — PATIENT MESSAGE (OUTPATIENT)
Dept: OBSTETRICS AND GYNECOLOGY | Facility: CLINIC | Age: 38
End: 2023-02-25
Payer: COMMERCIAL

## 2023-03-07 ENCOUNTER — LAB VISIT (OUTPATIENT)
Dept: LAB | Facility: HOSPITAL | Age: 38
End: 2023-03-07
Attending: STUDENT IN AN ORGANIZED HEALTH CARE EDUCATION/TRAINING PROGRAM
Payer: COMMERCIAL

## 2023-03-07 ENCOUNTER — OFFICE VISIT (OUTPATIENT)
Dept: OBSTETRICS AND GYNECOLOGY | Facility: CLINIC | Age: 38
End: 2023-03-07
Payer: COMMERCIAL

## 2023-03-07 VITALS
SYSTOLIC BLOOD PRESSURE: 120 MMHG | BODY MASS INDEX: 41.69 KG/M2 | WEIGHT: 227.94 LBS | DIASTOLIC BLOOD PRESSURE: 80 MMHG

## 2023-03-07 DIAGNOSIS — Z12.4 ENCOUNTER FOR SCREENING FOR CERVICAL CANCER: ICD-10-CM

## 2023-03-07 DIAGNOSIS — Z11.51 ENCOUNTER FOR SCREENING FOR HUMAN PAPILLOMAVIRUS (HPV): ICD-10-CM

## 2023-03-07 DIAGNOSIS — Z11.3 SCREENING EXAMINATION FOR STD (SEXUALLY TRANSMITTED DISEASE): ICD-10-CM

## 2023-03-07 DIAGNOSIS — Z01.419 ENCOUNTER FOR GYNECOLOGICAL EXAMINATION: Primary | ICD-10-CM

## 2023-03-07 DIAGNOSIS — Z30.41 ENCOUNTER FOR SURVEILLANCE OF CONTRACEPTIVE PILLS: ICD-10-CM

## 2023-03-07 DIAGNOSIS — Z12.31 ENCOUNTER FOR MAMMOGRAM TO ESTABLISH BASELINE MAMMOGRAM: ICD-10-CM

## 2023-03-07 LAB
HBV SURFACE AG SERPL QL IA: NORMAL
HCV AB SERPL QL IA: NORMAL
HIV 1+2 AB+HIV1 P24 AG SERPL QL IA: NORMAL

## 2023-03-07 PROCEDURE — 86592 SYPHILIS TEST NON-TREP QUAL: CPT | Performed by: STUDENT IN AN ORGANIZED HEALTH CARE EDUCATION/TRAINING PROGRAM

## 2023-03-07 PROCEDURE — 88175 CYTOPATH C/V AUTO FLUID REDO: CPT | Performed by: STUDENT IN AN ORGANIZED HEALTH CARE EDUCATION/TRAINING PROGRAM

## 2023-03-07 PROCEDURE — 87389 HIV-1 AG W/HIV-1&-2 AB AG IA: CPT | Performed by: STUDENT IN AN ORGANIZED HEALTH CARE EDUCATION/TRAINING PROGRAM

## 2023-03-07 PROCEDURE — 3008F BODY MASS INDEX DOCD: CPT | Mod: CPTII,S$GLB,, | Performed by: STUDENT IN AN ORGANIZED HEALTH CARE EDUCATION/TRAINING PROGRAM

## 2023-03-07 PROCEDURE — 99395 PREV VISIT EST AGE 18-39: CPT | Mod: S$GLB,,, | Performed by: STUDENT IN AN ORGANIZED HEALTH CARE EDUCATION/TRAINING PROGRAM

## 2023-03-07 PROCEDURE — 99999 PR PBB SHADOW E&M-EST. PATIENT-LVL III: CPT | Mod: PBBFAC,,, | Performed by: STUDENT IN AN ORGANIZED HEALTH CARE EDUCATION/TRAINING PROGRAM

## 2023-03-07 PROCEDURE — 3079F DIAST BP 80-89 MM HG: CPT | Mod: CPTII,S$GLB,, | Performed by: STUDENT IN AN ORGANIZED HEALTH CARE EDUCATION/TRAINING PROGRAM

## 2023-03-07 PROCEDURE — 99999 PR PBB SHADOW E&M-EST. PATIENT-LVL III: ICD-10-PCS | Mod: PBBFAC,,, | Performed by: STUDENT IN AN ORGANIZED HEALTH CARE EDUCATION/TRAINING PROGRAM

## 2023-03-07 PROCEDURE — 86803 HEPATITIS C AB TEST: CPT | Performed by: STUDENT IN AN ORGANIZED HEALTH CARE EDUCATION/TRAINING PROGRAM

## 2023-03-07 PROCEDURE — 99395 PR PREVENTIVE VISIT,EST,18-39: ICD-10-PCS | Mod: S$GLB,,, | Performed by: STUDENT IN AN ORGANIZED HEALTH CARE EDUCATION/TRAINING PROGRAM

## 2023-03-07 PROCEDURE — 87340 HEPATITIS B SURFACE AG IA: CPT | Performed by: STUDENT IN AN ORGANIZED HEALTH CARE EDUCATION/TRAINING PROGRAM

## 2023-03-07 PROCEDURE — 3008F PR BODY MASS INDEX (BMI) DOCUMENTED: ICD-10-PCS | Mod: CPTII,S$GLB,, | Performed by: STUDENT IN AN ORGANIZED HEALTH CARE EDUCATION/TRAINING PROGRAM

## 2023-03-07 PROCEDURE — 1159F MED LIST DOCD IN RCRD: CPT | Mod: CPTII,S$GLB,, | Performed by: STUDENT IN AN ORGANIZED HEALTH CARE EDUCATION/TRAINING PROGRAM

## 2023-03-07 PROCEDURE — 87624 HPV HI-RISK TYP POOLED RSLT: CPT | Performed by: STUDENT IN AN ORGANIZED HEALTH CARE EDUCATION/TRAINING PROGRAM

## 2023-03-07 PROCEDURE — 3079F PR MOST RECENT DIASTOLIC BLOOD PRESSURE 80-89 MM HG: ICD-10-PCS | Mod: CPTII,S$GLB,, | Performed by: STUDENT IN AN ORGANIZED HEALTH CARE EDUCATION/TRAINING PROGRAM

## 2023-03-07 PROCEDURE — 36415 COLL VENOUS BLD VENIPUNCTURE: CPT | Performed by: STUDENT IN AN ORGANIZED HEALTH CARE EDUCATION/TRAINING PROGRAM

## 2023-03-07 PROCEDURE — 87591 N.GONORRHOEAE DNA AMP PROB: CPT | Performed by: STUDENT IN AN ORGANIZED HEALTH CARE EDUCATION/TRAINING PROGRAM

## 2023-03-07 PROCEDURE — 3074F SYST BP LT 130 MM HG: CPT | Mod: CPTII,S$GLB,, | Performed by: STUDENT IN AN ORGANIZED HEALTH CARE EDUCATION/TRAINING PROGRAM

## 2023-03-07 PROCEDURE — 3074F PR MOST RECENT SYSTOLIC BLOOD PRESSURE < 130 MM HG: ICD-10-PCS | Mod: CPTII,S$GLB,, | Performed by: STUDENT IN AN ORGANIZED HEALTH CARE EDUCATION/TRAINING PROGRAM

## 2023-03-07 PROCEDURE — 1159F PR MEDICATION LIST DOCUMENTED IN MEDICAL RECORD: ICD-10-PCS | Mod: CPTII,S$GLB,, | Performed by: STUDENT IN AN ORGANIZED HEALTH CARE EDUCATION/TRAINING PROGRAM

## 2023-03-07 RX ORDER — NORETHINDRONE ACETATE AND ETHINYL ESTRADIOL 1MG-20(21)
1 KIT ORAL DAILY
Qty: 84 TABLET | Refills: 3 | Status: SHIPPED | OUTPATIENT
Start: 2023-03-07 | End: 2024-03-08 | Stop reason: SDUPTHER

## 2023-03-08 LAB — RPR SER QL: NORMAL

## 2023-03-08 NOTE — PROGRESS NOTES
Chief Complaint: Well Woman Exam     HPI:      Lynn Green is a 37 y.o.  who presents today for well woman exam. Cycles irregular, desires to restart OCPs. LMP 23. Desires STD screening today due to new sexual partner. No other GYN issues, problems, or complaints. Specifically, patient denies discharge, pelvic pain, urinary problems, or changes in appetite. Ms. Green  is sexually active . She is currently using condoms for contraception. She would like STD screening today.    Previous Pap: NILM, HPV negative (21)    GYNHx: +history of syphilis treated 1 yr ago    Past Medical History:   Diagnosis Date    Allergy     Cystitis     Heart murmur     as a child    History of hematuria     Kidney stones     Kidney stones     Positive RPR test 10/18/2019    Urinary tract infection      Past Surgical History:   Procedure Laterality Date     SECTION N/A 10/19/2019    Procedure:  SECTION;  Surgeon: Hollie Sandra MD;  Location: [facility] L&D;  Service: OB/GYN;  Laterality: N/A;    CYSTOSCOPY       Social History     Socioeconomic History    Marital status: Single   Occupational History     Employer: [employer]   Tobacco Use    Smoking status: Never    Smokeless tobacco: Never   Substance and Sexual Activity    Alcohol use: Yes     Comment: socially    Drug use: No    Sexual activity: Yes     Partners: Male     Birth control/protection: None, Condom     Comment: single       OB History          1    Para   1    Term   1            AB        Living   1         SAB        IAB        Ectopic        Multiple   0    Live Births   1               Physical Exam:      PHYSICAL EXAM:  /80 (BP Location: Left arm, Patient Position: Sitting, BP Method: Medium (Manual))   Wt 103.4 kg (227 lb 15.3 oz)   LMP  (LMP Unknown)   BMI 41.69 kg/m²   Body mass index is 41.69 kg/m².     APPEARANCE: Well nourished, well developed, in no acute distress.  PSYCH: Appropriate  mood and affect.  SKIN: No acne or hirsutism  NECK: Neck symmetric without masses  NODES: No inguinal, axillary, lymph node enlargement  ABDOMEN: Soft.  No tenderness or masses.    CARDIOVASCULAR: No edema of peripheral extremities  BREASTS: Symmetrical, no visible skin lesions. No palpable masses. No nipple discharge bilaterally.  PELVIC: Normal external genitalia without lesions.  Normal hair distribution.  Adequate perineal body, normal urethral meatus.  Vagina moist and well rugated. Without lesions. With physiologic discharge.  Cervix pink, without lesions, discharge or tenderness.  No significant cystocele or rectocele.  Bimanual exam shows uterus to be normal size, regular, mobile and nontender.  Adnexa without masses or tenderness.      Assessment/Plan:     Encounter for gynecological examination    Screening examination for STD (sexually transmitted disease)  -     C. trachomatis/N. gonorrhoeae by AMP DNA [facility]; Cervix  -     RPR; Future; Expected date: 03/07/2023  -     HIV 1/2 Ag/Ab (4th Gen); Future; Expected date: 03/07/2023  -     Hepatitis B Surface Antigen; Future; Expected date: 03/07/2023  -     Hepatitis C Antibody; Future; Expected date: 03/07/2023    Encounter for mammogram to establish baseline mammogram  -     Mammo Digital Screening Bilat w/ Ramón; Future; Expected date: 03/07/2023    Encounter for screening for human papillomavirus (HPV)  -     HPV High Risk Genotypes, PCR    Encounter for screening for cervical cancer  -     Liquid-Based Pap Smear, Screening    Encounter for surveillance of contraceptive pills  -     norethindrone-ethinyl estradiol (JUNEL FE 1/20) 1 mg-20 mcg (21)/75 mg (7) per tablet; Take 1 tablet by mouth once daily.  Dispense: 84 tablet; Refill: 3    - pelvic exam normal  - pap/hpv collected  - gc/cz collected and serum STD panel per pt request  - desires to restart OCPs, denies contraindications, Rx Blisovi to pharmacy, instructed on initiating pills and to take at  same time daily for greatest efficacy  - MMG yearly at 37 yo  - routine labs with PCP     Follow up in about 1 year (around 3/7/2024) for annual exam.    Counseling:     Patient was counseled today on current ASCCP pap guidelines, the recommendation for yearly physical exams, safe driving habits, breast self awareness and annual mammograms at 39yo. She is to see her PCP for other health maintenance.     Use of the [employer] Patient Portal discussed and encouraged during today's visit.     Esthela Quiles MD  Obstetrics and Gynecology  [facility]

## 2023-03-09 LAB
C TRACH DNA SPEC QL NAA+PROBE: NOT DETECTED
N GONORRHOEA DNA SPEC QL NAA+PROBE: NOT DETECTED

## 2023-03-15 ENCOUNTER — TELEPHONE (OUTPATIENT)
Dept: OBSTETRICS AND GYNECOLOGY | Facility: CLINIC | Age: 38
End: 2023-03-15
Payer: COMMERCIAL

## 2023-10-30 ENCOUNTER — HOSPITAL ENCOUNTER (EMERGENCY)
Facility: HOSPITAL | Age: 38
Discharge: HOME | End: 2023-10-30
Attending: EMERGENCY MEDICINE
Payer: COMMERCIAL

## 2023-10-30 VITALS
WEIGHT: 230 LBS | TEMPERATURE: 98 F | DIASTOLIC BLOOD PRESSURE: 92 MMHG | SYSTOLIC BLOOD PRESSURE: 152 MMHG | HEART RATE: 95 BPM | BODY MASS INDEX: 42.07 KG/M2 | OXYGEN SATURATION: 100 % | RESPIRATION RATE: 18 BRPM
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
## 2023-10-30 DIAGNOSIS — S46.819A STRAIN OF TRAPEZIUS MUSCLE, UNSPECIFIED LATERALITY, INITIAL ENCOUNTER: Primary | ICD-10-CM

## 2023-10-30 LAB
B-HCG UR QL: NEGATIVE
CTP QC/QA: YES

## 2023-10-30 PROCEDURE — 81025 URINE PREGNANCY TEST: CPT | Performed by: NURSE PRACTITIONER

## 2023-10-30 PROCEDURE — 99284 EMERGENCY DEPT VISIT MOD MDM: CPT

## 2023-10-30 PROCEDURE — 25000003 PHARM REV CODE 250: Performed by: NURSE PRACTITIONER

## 2023-10-30 RX ORDER — LIDOCAINE 50 MG/G
1 PATCH TOPICAL DAILY
Qty: 7 PATCH | Refills: 0 | Status: SHIPPED | OUTPATIENT
Start: 2023-10-30 | End: 2023-11-06

## 2023-10-30 RX ORDER — IBUPROFEN 400 MG/1
800 TABLET ORAL
Status: COMPLETED | OUTPATIENT
Start: 2023-10-30 | End: 2023-10-30

## 2023-10-30 RX ORDER — METHOCARBAMOL 500 MG/1
500 TABLET, FILM COATED ORAL
Status: COMPLETED | OUTPATIENT
Start: 2023-10-30 | End: 2023-10-30

## 2023-10-30 RX ORDER — METHOCARBAMOL 500 MG/1
500 TABLET, FILM COATED ORAL 3 TIMES DAILY
Qty: 15 TABLET | Refills: 0 | Status: SHIPPED | OUTPATIENT
Start: 2023-10-30 | End: 2023-11-04

## 2023-10-30 RX ORDER — IBUPROFEN 800 MG/1
800 TABLET ORAL EVERY 6 HOURS PRN
Qty: 20 TABLET | Refills: 0 | Status: SHIPPED | OUTPATIENT
Start: 2023-10-30 | End: 2024-01-29

## 2023-10-30 RX ADMIN — IBUPROFEN 800 MG: 400 TABLET ORAL at 03:10

## 2023-10-30 RX ADMIN — METHOCARBAMOL TABLETS 500 MG: 500 TABLET, COATED ORAL at 03:10

## 2023-10-30 NOTE — DISCHARGE INSTRUCTIONS
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
## 2023-11-06 ENCOUNTER — TELEPHONE (OUTPATIENT)
Dept: ADMINISTRATIVE | Facility: OTHER | Age: 38
End: 2023-11-06
Payer: COMMERCIAL

## 2023-11-22 ENCOUNTER — HOSPITAL ENCOUNTER (OUTPATIENT)
Dept: RADIOLOGY | Facility: HOSPITAL | Age: 38
Discharge: HOME | End: 2023-11-22
Attending: STUDENT IN AN ORGANIZED HEALTH CARE EDUCATION/TRAINING PROGRAM
Payer: COMMERCIAL

## 2023-11-22 VITALS — WEIGHT: 230 LBS | BODY MASS INDEX: 42.33 KG/M2 | HEIGHT: 62 IN

## 2023-11-22 DIAGNOSIS — Z12.31 ENCOUNTER FOR MAMMOGRAM TO ESTABLISH BASELINE MAMMOGRAM: ICD-10-CM

## 2023-11-22 PROCEDURE — 77063 BREAST TOMOSYNTHESIS BI: CPT | Mod: 26,,, | Performed by: RADIOLOGY

## 2023-11-22 PROCEDURE — 77067 MAMMO DIGITAL SCREENING BILAT WITH TOMO: ICD-10-PCS | Mod: 26,,, | Performed by: RADIOLOGY

## 2023-11-22 PROCEDURE — 77067 SCR MAMMO BI INCL CAD: CPT | Mod: TC

## 2023-11-22 PROCEDURE — 77067 SCR MAMMO BI INCL CAD: CPT | Mod: 26,,, | Performed by: RADIOLOGY

## 2023-11-22 PROCEDURE — 77063 MAMMO DIGITAL SCREENING BILAT WITH TOMO: ICD-10-PCS | Mod: 26,,, | Performed by: RADIOLOGY

## 2024-01-16 ENCOUNTER — PATIENT OUTREACH (OUTPATIENT)
Dept: ADMINISTRATIVE | Facility: HOSPITAL | Age: 39
End: 2024-01-16
Payer: COMMERCIAL

## 2024-01-29 ENCOUNTER — LAB VISIT (OUTPATIENT)
Dept: LAB | Facility: OTHER | Age: 39
End: 2024-01-29
Attending: STUDENT IN AN ORGANIZED HEALTH CARE EDUCATION/TRAINING PROGRAM
Payer: COMMERCIAL

## 2024-01-29 ENCOUNTER — OFFICE VISIT (OUTPATIENT)
Dept: INTERNAL MEDICINE | Facility: CLINIC | Age: 39
End: 2024-01-29
Payer: COMMERCIAL

## 2024-01-29 VITALS
BODY MASS INDEX: 43.9 KG/M2 | HEART RATE: 82 BPM | WEIGHT: 238.56 LBS | OXYGEN SATURATION: 98 % | DIASTOLIC BLOOD PRESSURE: 80 MMHG | HEIGHT: 62 IN | SYSTOLIC BLOOD PRESSURE: 120 MMHG

## 2024-01-29 DIAGNOSIS — Z00.00 PREVENTATIVE HEALTH CARE: ICD-10-CM

## 2024-01-29 DIAGNOSIS — E66.01 CLASS 3 SEVERE OBESITY WITH BODY MASS INDEX (BMI) OF 40.0 TO 44.9 IN ADULT, UNSPECIFIED OBESITY TYPE, UNSPECIFIED WHETHER SERIOUS COMORBIDITY PRESENT: ICD-10-CM

## 2024-01-29 DIAGNOSIS — Z00.00 PREVENTATIVE HEALTH CARE: Primary | ICD-10-CM

## 2024-01-29 LAB
ALBUMIN SERPL BCP-MCNC: 3.9 G/DL (ref 3.5–5.2)
ALP SERPL-CCNC: 68 U/L (ref 55–135)
ALT SERPL W/O P-5'-P-CCNC: 12 U/L (ref 10–44)
ANION GAP SERPL CALC-SCNC: 8 MMOL/L (ref 8–16)
AST SERPL-CCNC: 15 U/L (ref 10–40)
BASOPHILS # BLD AUTO: 0.03 K/UL (ref 0–0.2)
BASOPHILS NFR BLD: 0.6 % (ref 0–1.9)
BILIRUB SERPL-MCNC: 0.3 MG/DL (ref 0.1–1)
BUN SERPL-MCNC: 8 MG/DL (ref 6–20)
CALCIUM SERPL-MCNC: 9.1 MG/DL (ref 8.7–10.5)
CHLORIDE SERPL-SCNC: 107 MMOL/L (ref 95–110)
CHOLEST SERPL-MCNC: 200 MG/DL (ref 120–199)
CHOLEST/HDLC SERPL: 4.5 {RATIO} (ref 2–5)
CO2 SERPL-SCNC: 24 MMOL/L (ref 23–29)
CREAT SERPL-MCNC: 0.6 MG/DL (ref 0.5–1.4)
DIFFERENTIAL METHOD BLD: ABNORMAL
EOSINOPHIL # BLD AUTO: 0.1 K/UL (ref 0–0.5)
EOSINOPHIL NFR BLD: 1.4 % (ref 0–8)
ERYTHROCYTE [DISTWIDTH] IN BLOOD BY AUTOMATED COUNT: 14.1 % (ref 11.5–14.5)
EST. GFR  (NO RACE VARIABLE): >60 ML/MIN/1.73 M^2
ESTIMATED AVG GLUCOSE: 117 MG/DL (ref 68–131)
GLUCOSE SERPL-MCNC: 89 MG/DL (ref 70–110)
HBA1C MFR BLD: 5.7 % (ref 4–5.6)
HCT VFR BLD AUTO: 39.5 % (ref 37–48.5)
HDLC SERPL-MCNC: 44 MG/DL (ref 40–75)
HDLC SERPL: 22 % (ref 20–50)
HGB BLD-MCNC: 12.8 G/DL (ref 12–16)
IMM GRANULOCYTES # BLD AUTO: 0.01 K/UL (ref 0–0.04)
IMM GRANULOCYTES NFR BLD AUTO: 0.2 % (ref 0–0.5)
LDLC SERPL CALC-MCNC: 141.6 MG/DL (ref 63–159)
LYMPHOCYTES # BLD AUTO: 2.5 K/UL (ref 1–4.8)
LYMPHOCYTES NFR BLD: 49.2 % (ref 18–48)
MCH RBC QN AUTO: 27.8 PG (ref 27–31)
MCHC RBC AUTO-ENTMCNC: 32.4 G/DL (ref 32–36)
MCV RBC AUTO: 86 FL (ref 82–98)
MONOCYTES # BLD AUTO: 0.3 K/UL (ref 0.3–1)
MONOCYTES NFR BLD: 5.8 % (ref 4–15)
NEUTROPHILS # BLD AUTO: 2.2 K/UL (ref 1.8–7.7)
NEUTROPHILS NFR BLD: 42.8 % (ref 38–73)
NONHDLC SERPL-MCNC: 156 MG/DL
NRBC BLD-RTO: 0 /100 WBC
PLATELET # BLD AUTO: 316 K/UL (ref 150–450)
PMV BLD AUTO: 10.1 FL (ref 9.2–12.9)
POTASSIUM SERPL-SCNC: 4 MMOL/L (ref 3.5–5.1)
PROT SERPL-MCNC: 7.9 G/DL (ref 6–8.4)
RBC # BLD AUTO: 4.61 M/UL (ref 4–5.4)
SODIUM SERPL-SCNC: 139 MMOL/L (ref 136–145)
TRIGL SERPL-MCNC: 72 MG/DL (ref 30–150)
TSH SERPL DL<=0.005 MIU/L-ACNC: 1.5 UIU/ML (ref 0.4–4)
WBC # BLD AUTO: 5.16 K/UL (ref 3.9–12.7)

## 2024-01-29 PROCEDURE — 85025 COMPLETE CBC W/AUTO DIFF WBC: CPT | Performed by: STUDENT IN AN ORGANIZED HEALTH CARE EDUCATION/TRAINING PROGRAM

## 2024-01-29 PROCEDURE — 1159F MED LIST DOCD IN RCRD: CPT | Mod: CPTII,S$GLB,, | Performed by: STUDENT IN AN ORGANIZED HEALTH CARE EDUCATION/TRAINING PROGRAM

## 2024-01-29 PROCEDURE — 3074F SYST BP LT 130 MM HG: CPT | Mod: CPTII,S$GLB,, | Performed by: STUDENT IN AN ORGANIZED HEALTH CARE EDUCATION/TRAINING PROGRAM

## 2024-01-29 PROCEDURE — 3079F DIAST BP 80-89 MM HG: CPT | Mod: CPTII,S$GLB,, | Performed by: STUDENT IN AN ORGANIZED HEALTH CARE EDUCATION/TRAINING PROGRAM

## 2024-01-29 PROCEDURE — 84443 ASSAY THYROID STIM HORMONE: CPT | Performed by: STUDENT IN AN ORGANIZED HEALTH CARE EDUCATION/TRAINING PROGRAM

## 2024-01-29 PROCEDURE — 36415 COLL VENOUS BLD VENIPUNCTURE: CPT | Performed by: STUDENT IN AN ORGANIZED HEALTH CARE EDUCATION/TRAINING PROGRAM

## 2024-01-29 PROCEDURE — 99999 PR PBB SHADOW E&M-EST. PATIENT-LVL III: CPT | Mod: PBBFAC,,, | Performed by: STUDENT IN AN ORGANIZED HEALTH CARE EDUCATION/TRAINING PROGRAM

## 2024-01-29 PROCEDURE — 83036 HEMOGLOBIN GLYCOSYLATED A1C: CPT | Performed by: STUDENT IN AN ORGANIZED HEALTH CARE EDUCATION/TRAINING PROGRAM

## 2024-01-29 PROCEDURE — 80053 COMPREHEN METABOLIC PANEL: CPT | Performed by: STUDENT IN AN ORGANIZED HEALTH CARE EDUCATION/TRAINING PROGRAM

## 2024-01-29 PROCEDURE — 80061 LIPID PANEL: CPT | Performed by: STUDENT IN AN ORGANIZED HEALTH CARE EDUCATION/TRAINING PROGRAM

## 2024-01-29 PROCEDURE — 99386 PREV VISIT NEW AGE 40-64: CPT | Mod: S$GLB,,, | Performed by: STUDENT IN AN ORGANIZED HEALTH CARE EDUCATION/TRAINING PROGRAM

## 2024-01-29 PROCEDURE — 3008F BODY MASS INDEX DOCD: CPT | Mod: CPTII,S$GLB,, | Performed by: STUDENT IN AN ORGANIZED HEALTH CARE EDUCATION/TRAINING PROGRAM

## 2024-01-29 NOTE — PROGRESS NOTES
Subjective:       Patient ID: Lynn Green is a 38 y.o. female.    Chief Complaint: Weight Loss and Establish Care    HPI  Per chart review, there are documentation w/ different specialists at [facility]. However patient reports these encounters are not hers. She denies any active cardiac, pulmonary or GI related issues. She has not seen her previous PCP in 2 years.    Establishing. Diet: healthy to her. Exercise: works on her feet for [employer]. Smokes/Vape/illicit drug use: denies. Alcohol use: wine infrequently. Medications/supplements: reviewed.     She has difficulty with weight loss. She endorses gradual increase in weight over last 4 years. Acknowledges diet is good.     She is established with Ob/gyn for woman's health, sees Dr. Quiles. Up to date on PAP: yes. Up to date on mammogram: yes.  All of your core healthy metrics are met.      Social History     Social History Narrative    Not on file       Family History   Problem Relation Age of Onset    Diabetes Mother     Hypertension Mother     Hypertension Father     Prostate cancer Father     Cancer Father         bladder cancer    Colon cancer Father     Hypertension Sister     Breast cancer Neg Hx     Ovarian cancer Neg Hx     Kidney disease Neg Hx     Stroke Neg Hx        Current Outpatient Medications:     MULTIVITAMIN ORAL, Take 1 tablet by mouth., Disp: , Rfl:     norethindrone-ethinyl estradiol (JUNEL FE 1/20) 1 mg-20 mcg (21)/75 mg (7) per tablet, Take 1 tablet by mouth once daily., Disp: 84 tablet, Rfl: 3    Review of Systems   Constitutional:  Negative for chills, fever and unexpected weight change.   Respiratory:  Negative for cough and shortness of breath.    Cardiovascular:  Negative for chest pain.   Gastrointestinal:  Negative for constipation, diarrhea, nausea and vomiting.   Genitourinary:  Negative for difficulty urinating.   Musculoskeletal:  Negative for gait problem.   Neurological:  Negative for weakness.   Psychiatric/Behavioral:   "Negative for behavioral problems.        Objective:   /80   Pulse 82   Ht 5' 2" (1.575 m)   Wt 108.2 kg (238 lb 8.6 oz)   SpO2 98%   BMI 43.63 kg/m²      Physical Exam  Vitals and nursing note reviewed.   Constitutional:       General: She is not in acute distress.     Appearance: Normal appearance. She is obese. She is not ill-appearing, toxic-appearing or diaphoretic.   Eyes:      General:         Right eye: No discharge.         Left eye: No discharge.      Conjunctiva/sclera: Conjunctivae normal.   Cardiovascular:      Rate and Rhythm: Normal rate and regular rhythm.   Pulmonary:      Effort: Pulmonary effort is normal. No respiratory distress.      Breath sounds: Normal breath sounds. No wheezing.   Abdominal:      Palpations: Abdomen is soft.      Tenderness: There is no abdominal tenderness. There is no guarding.   Neurological:      Mental Status: She is alert.   Psychiatric:         Behavior: Behavior normal.         Assessment & Plan   1. Preventative health care  Encouraged healthy diet and exercise. Reviewed overdue HCM vaccines with patient today.  - CBC Auto Differential; Future  - Comprehensive Metabolic Panel; Future  - Hemoglobin A1C; Future  - Lipid Panel; Future  - TSH; Future    2. Class 3 severe obesity with body mass index (BMI) of 40.0 to 44.9 in adult, unspecified obesity type, unspecified whether serious comorbidity present  -screening for comorbid conditions.   - Ambulatory referral/consult to Bariatric/Obesity Medicine; Future    Follow up in about 6 months (around 7/29/2024).    Disclaimer:  This note may have been prepared using voice recognition software, it may have not been extensively proofed, as such there could be errors within the text such as sound alike errors.    "

## 2024-01-30 ENCOUNTER — TELEPHONE (OUTPATIENT)
Dept: ADMINISTRATIVE | Facility: OTHER | Age: 39
End: 2024-01-30
Payer: COMMERCIAL

## 2024-01-30 NOTE — TELEPHONE ENCOUNTER
Left voice message for patient to return call to schedule appointment from referral to Bariatrics department. Contact number provided, and My Chart message to be sent.  Hina SIDDIQUI [phone]

## 2024-02-07 ENCOUNTER — TELEPHONE (OUTPATIENT)
Dept: ADMINISTRATIVE | Facility: OTHER | Age: 39
End: 2024-02-07
Payer: COMMERCIAL

## 2024-02-07 NOTE — TELEPHONE ENCOUNTER
Left voice message for patient to return call to schedule appointment from referral to Bariatrics department. Contact number provided for scheduling, and My Chart message to be sent..  Hina SIDDIQUI [phone]

## 2024-03-08 ENCOUNTER — PATIENT MESSAGE (OUTPATIENT)
Dept: OBSTETRICS AND GYNECOLOGY | Facility: CLINIC | Age: 39
End: 2024-03-08
Payer: COMMERCIAL

## 2024-03-08 DIAGNOSIS — Z30.41 ENCOUNTER FOR SURVEILLANCE OF CONTRACEPTIVE PILLS: ICD-10-CM

## 2024-03-08 RX ORDER — NORETHINDRONE ACETATE AND ETHINYL ESTRADIOL 1MG-20(21)
1 KIT ORAL DAILY
Qty: 84 TABLET | Refills: 3 | Status: SHIPPED | OUTPATIENT
Start: 2024-03-08 | End: 2025-03-08

## 2024-04-01 ENCOUNTER — TELEPHONE (OUTPATIENT)
Dept: BARIATRICS | Facility: CLINIC | Age: 39
End: 2024-04-01
Payer: COMMERCIAL

## 2024-04-05 ENCOUNTER — TELEPHONE (OUTPATIENT)
Dept: BARIATRICS | Facility: CLINIC | Age: 39
End: 2024-04-05
Payer: COMMERCIAL

## 2024-05-17 ENCOUNTER — HOSPITAL ENCOUNTER (EMERGENCY)
Facility: HOSPITAL | Age: 39
Discharge: HOME | End: 2024-05-17
Attending: STUDENT IN AN ORGANIZED HEALTH CARE EDUCATION/TRAINING PROGRAM
Payer: COMMERCIAL

## 2024-05-17 VITALS
WEIGHT: 235 LBS | BODY MASS INDEX: 43.24 KG/M2 | RESPIRATION RATE: 18 BRPM | OXYGEN SATURATION: 100 % | TEMPERATURE: 98 F | DIASTOLIC BLOOD PRESSURE: 97 MMHG | SYSTOLIC BLOOD PRESSURE: 155 MMHG | HEART RATE: 80 BPM | HEIGHT: 62 IN

## 2024-05-17 DIAGNOSIS — K08.89 PAIN, DENTAL: Primary | ICD-10-CM

## 2024-05-17 PROCEDURE — 99284 EMERGENCY DEPT VISIT MOD MDM: CPT

## 2024-05-17 PROCEDURE — 63600175 PHARM REV CODE 636 W HCPCS: Mod: JZ,JG | Performed by: STUDENT IN AN ORGANIZED HEALTH CARE EDUCATION/TRAINING PROGRAM

## 2024-05-17 RX ORDER — BUPIVACAINE HYDROCHLORIDE 2.5 MG/ML
10 INJECTION, SOLUTION EPIDURAL; INFILTRATION; INTRACAUDAL
Status: COMPLETED | OUTPATIENT
Start: 2024-05-17 | End: 2024-05-17

## 2024-05-17 RX ORDER — ACETAMINOPHEN 500 MG
1000 TABLET ORAL EVERY 8 HOURS PRN
Qty: 18 TABLET | Refills: 0 | Status: SHIPPED | OUTPATIENT
Start: 2024-05-17 | End: 2024-05-20

## 2024-05-17 RX ORDER — OXYCODONE HYDROCHLORIDE 5 MG/1
5 TABLET ORAL EVERY 8 HOURS PRN
Qty: 9 TABLET | Refills: 0 | Status: SHIPPED | OUTPATIENT
Start: 2024-05-17 | End: 2024-05-20

## 2024-05-17 RX ADMIN — BUPIVACAINE HYDROCHLORIDE 25 MG: 2.5 INJECTION, SOLUTION EPIDURAL; INFILTRATION; INTRACAUDAL; PERINEURAL at 03:05

## 2024-05-17 NOTE — Clinical Note
"Lynn Barahonajuan Green was seen and treated in our emergency department on 5/17/2024.  She may return to work on 05/20/2024.       If you have any questions or concerns, please don't hesitate to call.      ANDREEA Torrez RN RN    "

## 2024-05-17 NOTE — Clinical Note
"Lynn Barahonajuan Green was seen and treated in our emergency department on 5/17/2024.  She may return to work on 05/21/2024.       If you have any questions or concerns, please don't hesitate to call.      Too Aquino MD"

## 2024-05-17 NOTE — ED NOTES
Pt presents to the ED c/o upper R tooth pain. Pain states she went to get a root canal fixed on yesterday and for personal reasons was not able to. She states she has a appt with a specialist on Monday to try and get the issue solved. She mentions to was told by her dentist to take OTC meds for pain. She says she has taken Tylenol with no relief. Pt is AA&OX4. VSS. Family member at bedside.

## 2024-05-17 NOTE — ED PROVIDER NOTES
Encounter Date: 2024       History     Chief Complaint   Patient presents with    Dental Pain     C/O right upper tooth pain. States she went to dentist yesterday and was told to take Tylenol for pain but it is not helping.   Right upper molar 2nd from the back.  HPI  38-year-old female presents brought in by self through triage for dental pain.  Patient regularly follows with dentist for routine care, she saw her dentist yesterday as she was having dental pain on the right upper molar 2nd from the back, she saw them in office, and was found to have a periapical abscess by her own description, and was told that the tooth would require OMFS/periodontal intervention for drainage, she was referred to them at this time and has an appointment for Monday morning for the procedure.  States that she is presenting now strictly because she was not given anything for pain at the time and was told to manage it with OTCs, and it has been painful to the point where it is preventing her from sleeping.  She is on amoxicillin already.  She denies fever, vomiting, difficulty swallowing, masses in the mouth, or any other systemic or infectious symptoms or other acute complaints.  She rates pain as severe.  Review of patient's allergies indicates:   Allergen Reactions    Sulfamethoxazole-trimethoprim Hives     Hives (skin)^  Hives (skin)^  Hives (skin)^       Past Medical History:   Diagnosis Date    Allergy     Cystitis     Heart murmur     as a child    History of hematuria     Kidney stones     Kidney stones     Positive RPR test 10/18/2019    Urinary tract infection      Past Surgical History:   Procedure Laterality Date     SECTION N/A 10/19/2019    Procedure:  SECTION;  Surgeon: Hollie Sandra MD;  [facility]: [facility];  Service: OB/GYN;  Laterality: N/A;    CYSTOSCOPY       Family History   Problem Relation Name Age of Onset    Diabetes Mother      Hypertension Mother      Hypertension Father      Prostate  cancer Father      Cancer Father          bladder cancer    Colon cancer Father      Hypertension Sister      Breast cancer Neg Hx      Ovarian cancer Neg Hx      Kidney disease Neg Hx      Stroke Neg Hx       Social History     Tobacco Use    Smoking status: Never    Smokeless tobacco: Never   Substance Use Topics    Alcohol use: Yes     Comment: socially    Drug use: No   Medical surgical, family, and social history all were reviewed and considered in medical decision-making.  Review of Systems  Complete review of systems was conducted and was negative except as per HPI and as marked    Physical Exam     Initial Vitals [05/17/24 0320]   BP Pulse Resp Temp SpO2   (!) 155/97 80 18 98.4 °F (36.9 °C) 100 %      MAP       --         Physical Exam  Physical:  General: Awake, alert, no acute distress  Head: Normocephalic, atraumatic  Dental:  Examination of the oropharynx does not show any areas of obvious pulpitis, gingivitis, or active caries, it certainly no evidence of drainable abscess.  The tooth does have a crown on it, there is a substantial amount of dental work but overall not in poor state of maldentition.  Neck: Trachea midline, full range of motion, no meningismus  ENT: Atraumatic, Airway Patent  Cardio: Regular Rate, Regular Rhythm, skin perfusion normal  Chest: Atraumatic, No respiratory distress no use of accessory muscles to breath, normal rate  Upper Ext: Atraumatic, Inspection normal, no swelling  Lower Ext: Atraumatic, Inspection normal, no swelling  Neuro: No gross cranial nerve abnormality, no lateralization, no gross sensory or motor deficits   ED Course   Procedures  Labs Reviewed - No data to display       Imaging Results    None          Medications   BUPivacaine (PF) 0.25% (2.5 mg/ml) injection 25 mg (25 mg Subcutaneous Given by Provider 5/17/24 0049)     Medical Decision Making  Risk  OTC drugs.  Prescription drug management.    38-year-old female who is presenting for dental pain, there is no  drainable abscess, she is already on antibiotics, she does not meet any sirs, , and is hemodynamically stable, she already has a diagnosis and appropriate timely referral for OMFS for pertinent the procedural intervention she needs, she is presenting for emergent management of pain control as she is in severe pain not managed without cc.  She will be prescribed short course of narcotic medication outpatient for support until her treatment on Monday, as well as multimodal therapy.  Risks benefits and alternatives were discussed in regards to dental block and we agreed to proceed with this.  It successfully alleviated all of her pain and she was counseled on precautions after this, as well as strict return precautions.    Procedure: superior alveolar nerve block - risks benefits and alternatives were all discussed at length my traditional manner, and we agreed to proceed, time-out performed, site marked, confirm patient information prior to proceeding as is typical.  The area was anesthetized with 27 gauge needle with 4 cc 0.25% bupivacaine, at which time adequate analgesia was obtained.  EBL is less than 1 mL.  Patient tolerated procedure without complication was improved after procedure.                                  Clinical Impression:  Final diagnoses:  [K08.89] Pain, dental (Primary)          ED Disposition Condition    Discharge Stable          ED Prescriptions       Medication Sig Dispense Start Date End Date Auth. Provider    oxyCODONE (ROXICODONE) 5 MG immediate release tablet Take 1 tablet (5 mg total) by mouth every 8 (eight) hours as needed. 9 tablet 5/17/2024 5/20/2024 Too Aquino MD    acetaminophen (TYLENOL) 500 MG tablet Take 2 tablets (1,000 mg total) by mouth every 8 (eight) hours as needed for Pain. 18 tablet 5/17/2024 5/20/2024 Too Aquino MD          Follow-up Information    None          Too Aquino MD  05/17/24 1913

## 2024-05-17 NOTE — DISCHARGE INSTRUCTIONS
Follow-up with the oral surgeon at your scheduled upcoming appointment.  Use medications as prescribed and return to the emergency department if condition worsens in any way.  Do not drive or operate machinery or mix with alcohol when taking oxycodone.

## 2024-06-25 ENCOUNTER — OFFICE VISIT (OUTPATIENT)
Dept: OBSTETRICS AND GYNECOLOGY | Facility: CLINIC | Age: 39
End: 2024-06-25
Payer: COMMERCIAL

## 2024-06-25 ENCOUNTER — LAB VISIT (OUTPATIENT)
Dept: LAB | Facility: HOSPITAL | Age: 39
End: 2024-06-25
Attending: STUDENT IN AN ORGANIZED HEALTH CARE EDUCATION/TRAINING PROGRAM
Payer: COMMERCIAL

## 2024-06-25 VITALS
BODY MASS INDEX: 45.11 KG/M2 | DIASTOLIC BLOOD PRESSURE: 91 MMHG | WEIGHT: 245.13 LBS | HEIGHT: 62 IN | SYSTOLIC BLOOD PRESSURE: 130 MMHG

## 2024-06-25 DIAGNOSIS — Z11.3 SCREEN FOR STD (SEXUALLY TRANSMITTED DISEASE): ICD-10-CM

## 2024-06-25 DIAGNOSIS — Z12.31 SCREENING MAMMOGRAM FOR BREAST CANCER: ICD-10-CM

## 2024-06-25 DIAGNOSIS — Z30.41 ENCOUNTER FOR SURVEILLANCE OF CONTRACEPTIVE PILLS: ICD-10-CM

## 2024-06-25 DIAGNOSIS — Z01.419 ENCOUNTER FOR GYNECOLOGICAL EXAMINATION: Primary | ICD-10-CM

## 2024-06-25 LAB
C TRACH DNA SPEC QL NAA+PROBE: NOT DETECTED
HBV SURFACE AG SERPL QL IA: NORMAL
HIV 1+2 AB+HIV1 P24 AG SERPL QL IA: NORMAL
N GONORRHOEA DNA SPEC QL NAA+PROBE: NOT DETECTED
TREPONEMA PALLIDUM IGG+IGM AB [PRESENCE] IN SERUM OR PLASMA BY IMMUNOASSAY: REACTIVE

## 2024-06-25 PROCEDURE — 1160F RVW MEDS BY RX/DR IN RCRD: CPT | Mod: CPTII,S$GLB,, | Performed by: STUDENT IN AN ORGANIZED HEALTH CARE EDUCATION/TRAINING PROGRAM

## 2024-06-25 PROCEDURE — 86593 SYPHILIS TEST NON-TREP QUANT: CPT | Performed by: STUDENT IN AN ORGANIZED HEALTH CARE EDUCATION/TRAINING PROGRAM

## 2024-06-25 PROCEDURE — 99999 PR PBB SHADOW E&M-EST. PATIENT-LVL III: CPT | Mod: PBBFAC,,, | Performed by: STUDENT IN AN ORGANIZED HEALTH CARE EDUCATION/TRAINING PROGRAM

## 2024-06-25 PROCEDURE — 86592 SYPHILIS TEST NON-TREP QUAL: CPT | Performed by: STUDENT IN AN ORGANIZED HEALTH CARE EDUCATION/TRAINING PROGRAM

## 2024-06-25 PROCEDURE — 3080F DIAST BP >= 90 MM HG: CPT | Mod: CPTII,S$GLB,, | Performed by: STUDENT IN AN ORGANIZED HEALTH CARE EDUCATION/TRAINING PROGRAM

## 2024-06-25 PROCEDURE — 86780 TREPONEMA PALLIDUM: CPT | Performed by: STUDENT IN AN ORGANIZED HEALTH CARE EDUCATION/TRAINING PROGRAM

## 2024-06-25 PROCEDURE — 87491 CHLMYD TRACH DNA AMP PROBE: CPT | Performed by: STUDENT IN AN ORGANIZED HEALTH CARE EDUCATION/TRAINING PROGRAM

## 2024-06-25 PROCEDURE — 99396 PREV VISIT EST AGE 40-64: CPT | Mod: S$GLB,,, | Performed by: STUDENT IN AN ORGANIZED HEALTH CARE EDUCATION/TRAINING PROGRAM

## 2024-06-25 PROCEDURE — 3075F SYST BP GE 130 - 139MM HG: CPT | Mod: CPTII,S$GLB,, | Performed by: STUDENT IN AN ORGANIZED HEALTH CARE EDUCATION/TRAINING PROGRAM

## 2024-06-25 PROCEDURE — 1159F MED LIST DOCD IN RCRD: CPT | Mod: CPTII,S$GLB,, | Performed by: STUDENT IN AN ORGANIZED HEALTH CARE EDUCATION/TRAINING PROGRAM

## 2024-06-25 PROCEDURE — 36415 COLL VENOUS BLD VENIPUNCTURE: CPT | Performed by: STUDENT IN AN ORGANIZED HEALTH CARE EDUCATION/TRAINING PROGRAM

## 2024-06-25 PROCEDURE — 3008F BODY MASS INDEX DOCD: CPT | Mod: CPTII,S$GLB,, | Performed by: STUDENT IN AN ORGANIZED HEALTH CARE EDUCATION/TRAINING PROGRAM

## 2024-06-25 PROCEDURE — 3044F HG A1C LEVEL LT 7.0%: CPT | Mod: CPTII,S$GLB,, | Performed by: STUDENT IN AN ORGANIZED HEALTH CARE EDUCATION/TRAINING PROGRAM

## 2024-06-25 PROCEDURE — 87340 HEPATITIS B SURFACE AG IA: CPT | Performed by: STUDENT IN AN ORGANIZED HEALTH CARE EDUCATION/TRAINING PROGRAM

## 2024-06-25 PROCEDURE — 87389 HIV-1 AG W/HIV-1&-2 AB AG IA: CPT | Performed by: STUDENT IN AN ORGANIZED HEALTH CARE EDUCATION/TRAINING PROGRAM

## 2024-06-25 RX ORDER — NORETHINDRONE ACETATE AND ETHINYL ESTRADIOL 1MG-20(21)
1 KIT ORAL DAILY
Qty: 84 TABLET | Refills: 3 | Status: SHIPPED | OUTPATIENT
Start: 2024-06-25 | End: 2025-06-25

## 2024-06-25 NOTE — PROGRESS NOTES
Chief Complaint: Well Woman Exam     HPI:      Lynn Green is a 38 y.o.  who presents today for well woman exam.  Regular cycle with OCP. LMP: Patient's last menstrual period was 2024.    Today patient's GYN complaints include: none. Does endorse some hair thinning over the past few months.   Specifically, patient denies abnormal vaginal bleeding, discharge, pelvic pain, urinary problems.    Ms. Green is currently sexually active with a single male partner. She is currently using oral contraceptives (estrogen/progesterone) for contraception. She would like STD screening today.    Previous Pap: NILM, HPV negative (3/12/2023)  Previous Mammogram: BiRads: 1 T-C Score: 12% (23)    Past Medical History:   Diagnosis Date    Allergy     Cystitis     Heart murmur     as a child    History of hematuria     Kidney stones     Kidney stones     Positive RPR test 10/18/2019    Urinary tract infection        Past Surgical History:   Procedure Laterality Date     SECTION N/A 10/19/2019    Procedure:  SECTION;  Surgeon: Hollie Sandra MD;  Location: [facility] L&D;  Service: OB/GYN;  Laterality: N/A;    CYSTOSCOPY         Social History     Socioeconomic History    Marital status: Single   Occupational History     Employer: [employer]   Tobacco Use    Smoking status: Never    Smokeless tobacco: Never   Substance and Sexual Activity    Alcohol use: Yes     Comment: socially    Drug use: No    Sexual activity: Yes     Partners: Male     Birth control/protection: None, Condom     Comment: single     Family History   Problem Relation Name Age of Onset    Diabetes Mother      Hypertension Mother      Hypertension Father      Prostate cancer Father      Cancer Father          bladder cancer    Colon cancer Father      Hypertension Sister      Breast cancer Neg Hx      Ovarian cancer Neg Hx      Kidney disease Neg Hx      Stroke Neg Hx         Review of patient's allergies indicates:  "  Allergen Reactions    Sulfamethoxazole-trimethoprim Hives and Other (See Comments)     Hives (skin)^       OB History          1    Para   1    Term   1            AB        Living   1         SAB        IAB        Ectopic        Multiple   0    Live Births   1                 Physical Exam:      PHYSICAL EXAM:  BP (!) 130/91   Ht 5' 2" (1.575 m)   Wt 111.2 kg (245 lb 2.4 oz)   LMP 2024   BMI 44.84 kg/m²   Body mass index is 44.84 kg/m².     APPEARANCE: Well nourished, well developed, in no acute distress.  PSYCH: Appropriate mood and affect.  SKIN: No acne or hirsutism  NECK: Neck symmetric without masses  NODES: No inguinal, axillary, or supraclavicular lymph node enlargement  ABDOMEN: Soft.  No tenderness or masses.    CARDIOVASCULAR: No edema of peripheral extremities  BREASTS: Symmetrical, no visible skin lesions. No palpable masses. No nipple discharge bilaterally.  PELVIC: Normal external genitalia without lesions.  Normal hair distribution.  Adequate perineal body, normal urethral meatus.  Vagina moist and well rugated. Without lesions. With physiologic discharge.  Cervix pink, without lesions, discharge or tenderness.  No significant cystocele or rectocele.  Bimanual exam shows uterus to be normal size, regular, mobile and nontender.  Adnexa without masses or tenderness.      Assessment/Plan:     Encounter for gynecological examination    Encounter for surveillance of contraceptive pills  -     norethindrone-ethinyl estradiol (BLISOVI FE , ,) 1 mg-20 mcg (21)/75 mg (7) per tablet; Take 1 tablet by mouth once daily.  Dispense: 84 tablet; Refill: 3    Screen for STD (sexually transmitted disease)  -     HIV 1/2 Ag/Ab (4th Gen); Future; Expected date: 2024  -     Treponema Pallidium Antibodies IgG, IgM; Future; Expected date: 2024  -     HEPATITIS B SURFACE ANTIGEN; Future; Expected date: 2024  -     C. trachomatis/N. gonorrhoeae by AMP DNA    Screening " mammogram for breast cancer  -     Mammo Digital Screening Bilat w/ Ramón; Future; Expected date: 06/25/2024      - pelvic exam normal  - pap/hpv due 3/2026  - MMG due and ordered  - STI panel and gc/cz collected  - recommend vitamins, nutrafol or viviscal for hair thinning - typically takes about 3 mos of consistent use for effect, if not improving may discuss switching OCP  - mild HTN today, no other elevated Bps on chart review other than during dental pain last month, no symptoms today - recommend monitoring and letting me know if remaining elevated so we can discus d/c'ing OCP/other form of contraceptive  - RTC 1 yr       Counseling:     Patient was counseled today on current ASCCP pap guidelines, the recommendation for yearly physical exams, safe driving habits, breast self awareness and annual mammograms. She is to see her PCP for other health maintenance.     Use of the [facility] Patient Portal discussed and encouraged during today's visit.

## 2024-06-26 LAB — RPR SER QL: NORMAL

## 2024-06-27 LAB — T PALLIDUM AB SER QL IF: REACTIVE

## 2024-09-27 ENCOUNTER — PATIENT MESSAGE (OUTPATIENT)
Dept: OBSTETRICS AND GYNECOLOGY | Facility: CLINIC | Age: 39
End: 2024-09-27
Payer: COMMERCIAL

## 2024-12-10 ENCOUNTER — HOSPITAL ENCOUNTER (OUTPATIENT)
Dept: RADIOLOGY | Facility: HOSPITAL | Age: 39
Discharge: HOME | End: 2024-12-10
Attending: STUDENT IN AN ORGANIZED HEALTH CARE EDUCATION/TRAINING PROGRAM
Payer: COMMERCIAL

## 2024-12-10 VITALS — BODY MASS INDEX: 45.08 KG/M2 | HEIGHT: 62 IN | WEIGHT: 245 LBS

## 2024-12-10 DIAGNOSIS — Z12.31 SCREENING MAMMOGRAM FOR BREAST CANCER: ICD-10-CM

## 2024-12-10 PROCEDURE — 77063 BREAST TOMOSYNTHESIS BI: CPT | Mod: 26,,, | Performed by: RADIOLOGY

## 2024-12-10 PROCEDURE — 77067 SCR MAMMO BI INCL CAD: CPT | Mod: 26,,, | Performed by: RADIOLOGY

## 2024-12-10 PROCEDURE — 77063 BREAST TOMOSYNTHESIS BI: CPT | Mod: TC

## 2024-12-16 ENCOUNTER — OFFICE VISIT (OUTPATIENT)
Dept: INTERNAL MEDICINE | Facility: CLINIC | Age: 39
End: 2024-12-16
Payer: COMMERCIAL

## 2024-12-16 VITALS — BODY MASS INDEX: 44.8 KG/M2 | WEIGHT: 244.94 LBS

## 2024-12-16 DIAGNOSIS — H92.03 EAR DISCOMFORT, BILATERAL: ICD-10-CM

## 2024-12-16 DIAGNOSIS — M54.2 NECK PAIN: Primary | ICD-10-CM

## 2024-12-16 PROCEDURE — 3044F HG A1C LEVEL LT 7.0%: CPT | Mod: CPTII,95,, | Performed by: FAMILY MEDICINE

## 2024-12-16 PROCEDURE — 99212 OFFICE O/P EST SF 10 MIN: CPT | Mod: 95,,, | Performed by: FAMILY MEDICINE

## 2024-12-16 PROCEDURE — 1159F MED LIST DOCD IN RCRD: CPT | Mod: CPTII,95,, | Performed by: FAMILY MEDICINE

## 2024-12-16 PROCEDURE — 3008F BODY MASS INDEX DOCD: CPT | Mod: CPTII,95,, | Performed by: FAMILY MEDICINE

## 2024-12-16 PROCEDURE — 1160F RVW MEDS BY RX/DR IN RCRD: CPT | Mod: CPTII,95,, | Performed by: FAMILY MEDICINE

## 2024-12-16 NOTE — PROGRESS NOTES
Subjective:      Patient ID: Lynn Green is a 39 y.o. female.    The patient location is: [place]  The chief complaint leading to consultation is: Thyroid Problem (Neck under the chin )      Visit type: audiovisual    Face to Face time with patient: 7 min  10 minutes of total time spent on the encounter, which includes face to face time and non-face to face time preparing to see the patient (eg, review of tests), Obtaining and/or reviewing separately obtained history, Documenting clinical information in the electronic or other health record, Independently interpreting results (not separately reported) and communicating results to the patient/family/caregiver, or Care coordination (not separately reported).     Each patient to whom he or she provides medical services by telemedicine is:  (1) informed of the relationship between the physician and patient and the respective role of any other health care provider with respect to management of the patient; and (2) notified that he or she may decline to receive medical services by telemedicine and may withdraw from such care at any time.    Chief Complaint: Thyroid Problem (Neck under the chin )    History of Present Illness    CHIEF COMPLAINT:  - Ms. Green presents with neck pain, difficulty swallowing, and ear-related symptoms for approximately one month.    HPI:  Ms. Green reports neck pain, difficulty swallowing, and ear-related symptoms for 3-4 weeks. She describes pain when swallowing, which does not occur every time. Pressing on the middle of the neck causes pain. The neck pain has been persistent for about a month and has been worsening over time, although not extremely severe. She denies trouble swallowing saliva. No neck swelling. No fever.    Ms. Green describes itchy ears that feel wet. She denies ear pain, pressure, or trouble hearing.    Tylenol has been ineffective for pain relief. Ms. Green has not tried ibuprofen for symptom management.    Ms. Green  denies trouble swallowing, choking on food, feeling of something draining down the throat, or a need to constantly clear the throat.    Ms. Green denies fever, swelling, runny nose, stuffy nose, sneezing, thyroid issues, blood pressure issues, or other medical conditions.    MEDICATIONS:  - Birth control pills  - Multivitamin    ALLERGIES:  - Bactrim    MEDICAL HISTORY:  - Kidney stones  - Syphilis, treated  - Contraception: current birth control pills  -     SURGICAL HISTORY:  -   - Bladder surgery    FAMILY HISTORY:  - Mother: High blood pressure, diabetes  - Father: High blood pressure    SOCIAL HISTORY:  - Smoking: Denies current use  - Chewing tobacco: Denies current use          Review of systems negative except for those noted in above HPI.    Patient Active Problem List   Diagnosis    Dysuria    Microhematuria    Elderly primigravida in second trimester    Intrauterine pregnancy    Syphilis affecting pregnancy in third trimester    Delivery of pregnancy by  section          Current Outpatient Medications:     MULTIVITAMIN ORAL, Take 1 tablet by mouth., Disp: , Rfl:     norethindrone-ethinyl estradiol (BLISOVI FE , ,) 1 mg-20 mcg (21)/75 mg (7) per tablet, Take 1 tablet by mouth once daily., Disp: 84 tablet, Rfl: 3    Lab Results   Component Value Date     2024    K 4.0 2024     2024    CO2 24 2024    GLU 89 2024    BUN 8 2024    CREATININE 0.6 2024    CALCIUM 9.1 2024    PROT 7.9 2024    ALBUMIN 3.9 2024    BILITOT 0.3 2024    ALKPHOS 68 2024    AST 15 2024    ALT 12 2024    ANIONGAP 8 2024    ESTGFRAFRICA >60 2022    EGFRNONAA >60 2022    WBC 5.16 2024    HGB 12.8 2024    HGB 11.6 (L) 2022    HCT 39.5 2024    MCV 86 2024     2024    TSH 1.501 2024    HEPCAB Non-reactive 2023       Lab Results   Component Value  "Date    HGBA1C 5.7 (H) 01/29/2024    HGBA1C 5.7 04/20/2016     No results found for: "MICALBCREAT"  Lab Results   Component Value Date    LDLCALC 141.6 01/29/2024    LDLCALC 129.2 11/12/2015    CHOL 200 (H) 01/29/2024    HDL 44 01/29/2024    TRIG 72 01/29/2024       Reviewed PMH, PSH, SH, FH and any changes have been updated.    Vitals:    12/16/24 1605   Weight: 111.1 kg (244 lb 14.9 oz)   PainSc:   4   PainLoc: Neck     Objective:   Physical Exam    General: Pleasant. No acute distress. Well-developed. Well-nourished.  Eyes: EOMBI. Sclerae anicteric.  HENT: Normocephalic. Atraumatic. Nares patent. Moist oral mucosa.  Respiratory: Normal respiratory effort.   Neurological: Alert and lucid. No gross deficits.  Psychiatric: Normal mood. Normal affect. Good insight. Good judgment.         Assessment:         ICD-10-CM ICD-9-CM   1. Neck pain  M54.2 723.1   2. Ear discomfort, bilateral  H92.03 388.70        Plan:   Assessment & Plan     Considering differential diagnoses for patient's neck pain, ear symptoms, and swallowing discomfort   Less likely strep or abscess given intermittent prolonged nature, afebrile   Considering possible thyroid involvement; plan to examine and order labs   May need to pursue further imaging studies, such as neck ultrasound, pending physical exam findings    CERVICALGIA:  - Started ibuprofen as needed for pain relief, to be taken with food.    OTHER SPECIFIED DISORDERS OF EAR:  - Follow up in clinic for physical exam, including ear and neck evaluation.        Neck pain    Ear discomfort, bilateral        Chronic conditions status updated as per HPI.  Other than changes above, cont current medications and maintain follow up with specialists.      Follow up in about 2 weeks (around 12/30/2024) for f/u neck pain.         Reshma Prado MD  [facility]    This note was generated with the assistance of ambient listening technology. Verbal consent was obtained by the patient and " accompanying visitor(s) for the recording of patient appointment to facilitate this note. I attest to having reviewed and edited the generated note for accuracy, though some syntax or spelling errors may persist. Please contact the author of this note for any clarification.       There are no Patient Instructions on file for this visit.  All of your core healthy metrics are met.

## 2024-12-23 ENCOUNTER — LAB VISIT (OUTPATIENT)
Dept: LAB | Facility: OTHER | Age: 39
End: 2024-12-23
Attending: FAMILY MEDICINE
Payer: COMMERCIAL

## 2024-12-23 ENCOUNTER — PATIENT MESSAGE (OUTPATIENT)
Dept: GYNECOLOGIC ONCOLOGY | Facility: CLINIC | Age: 39
End: 2024-12-23
Payer: COMMERCIAL

## 2024-12-23 ENCOUNTER — OFFICE VISIT (OUTPATIENT)
Dept: INTERNAL MEDICINE | Facility: CLINIC | Age: 39
End: 2024-12-23
Payer: COMMERCIAL

## 2024-12-23 ENCOUNTER — TELEPHONE (OUTPATIENT)
Dept: ADMINISTRATIVE | Facility: OTHER | Age: 39
End: 2024-12-23
Payer: COMMERCIAL

## 2024-12-23 VITALS — OXYGEN SATURATION: 97 % | HEART RATE: 88 BPM | WEIGHT: 245.81 LBS | BODY MASS INDEX: 44.96 KG/M2

## 2024-12-23 DIAGNOSIS — E66.01 CLASS 3 SEVERE OBESITY WITH BODY MASS INDEX (BMI) OF 40.0 TO 44.9 IN ADULT, UNSPECIFIED OBESITY TYPE, UNSPECIFIED WHETHER SERIOUS COMORBIDITY PRESENT: ICD-10-CM

## 2024-12-23 DIAGNOSIS — M54.2 NECK PAIN: Primary | ICD-10-CM

## 2024-12-23 DIAGNOSIS — E66.813 CLASS 3 SEVERE OBESITY WITH BODY MASS INDEX (BMI) OF 40.0 TO 44.9 IN ADULT, UNSPECIFIED OBESITY TYPE, UNSPECIFIED WHETHER SERIOUS COMORBIDITY PRESENT: ICD-10-CM

## 2024-12-23 DIAGNOSIS — M54.2 NECK PAIN: ICD-10-CM

## 2024-12-23 DIAGNOSIS — Z71.85 IMMUNIZATION COUNSELING: ICD-10-CM

## 2024-12-23 LAB
ALBUMIN SERPL BCP-MCNC: 3.7 G/DL (ref 3.5–5.2)
ALP SERPL-CCNC: 82 U/L (ref 40–150)
ALT SERPL W/O P-5'-P-CCNC: 10 U/L (ref 10–44)
ANION GAP SERPL CALC-SCNC: 10 MMOL/L (ref 8–16)
AST SERPL-CCNC: 13 U/L (ref 10–40)
BASOPHILS # BLD AUTO: 0.02 K/UL (ref 0–0.2)
BASOPHILS NFR BLD: 0.4 % (ref 0–1.9)
BILIRUB SERPL-MCNC: 0.3 MG/DL (ref 0.1–1)
BUN SERPL-MCNC: 6 MG/DL (ref 6–20)
CALCIUM SERPL-MCNC: 9.2 MG/DL (ref 8.7–10.5)
CHLORIDE SERPL-SCNC: 107 MMOL/L (ref 95–110)
CO2 SERPL-SCNC: 22 MMOL/L (ref 23–29)
CREAT SERPL-MCNC: 0.6 MG/DL (ref 0.5–1.4)
DIFFERENTIAL METHOD BLD: NORMAL
EOSINOPHIL # BLD AUTO: 0.1 K/UL (ref 0–0.5)
EOSINOPHIL NFR BLD: 1.3 % (ref 0–8)
ERYTHROCYTE [DISTWIDTH] IN BLOOD BY AUTOMATED COUNT: 14.1 % (ref 11.5–14.5)
EST. GFR  (NO RACE VARIABLE): >60 ML/MIN/1.73 M^2
ESTIMATED AVG GLUCOSE: 117 MG/DL (ref 68–131)
GLUCOSE SERPL-MCNC: 93 MG/DL (ref 70–110)
HBA1C MFR BLD: 5.7 % (ref 4–5.6)
HCT VFR BLD AUTO: 39.4 % (ref 37–48.5)
HGB BLD-MCNC: 12.8 G/DL (ref 12–16)
IMM GRANULOCYTES # BLD AUTO: 0.01 K/UL (ref 0–0.04)
IMM GRANULOCYTES NFR BLD AUTO: 0.2 % (ref 0–0.5)
LYMPHOCYTES # BLD AUTO: 2.3 K/UL (ref 1–4.8)
LYMPHOCYTES NFR BLD: 43.2 % (ref 18–48)
MCH RBC QN AUTO: 28.1 PG (ref 27–31)
MCHC RBC AUTO-ENTMCNC: 32.5 G/DL (ref 32–36)
MCV RBC AUTO: 86 FL (ref 82–98)
MONOCYTES # BLD AUTO: 0.3 K/UL (ref 0.3–1)
MONOCYTES NFR BLD: 5.6 % (ref 4–15)
NEUTROPHILS # BLD AUTO: 2.6 K/UL (ref 1.8–7.7)
NEUTROPHILS NFR BLD: 49.3 % (ref 38–73)
NRBC BLD-RTO: 0 /100 WBC
PLATELET # BLD AUTO: 322 K/UL (ref 150–450)
PMV BLD AUTO: 10.1 FL (ref 9.2–12.9)
POTASSIUM SERPL-SCNC: 4.1 MMOL/L (ref 3.5–5.1)
PROT SERPL-MCNC: 7.6 G/DL (ref 6–8.4)
RBC # BLD AUTO: 4.56 M/UL (ref 4–5.4)
SODIUM SERPL-SCNC: 139 MMOL/L (ref 136–145)
T4 FREE SERPL-MCNC: 0.78 NG/DL (ref 0.71–1.51)
TSH SERPL DL<=0.005 MIU/L-ACNC: 1.33 UIU/ML (ref 0.4–4)
WBC # BLD AUTO: 5.21 K/UL (ref 3.9–12.7)

## 2024-12-23 PROCEDURE — 90656 IIV3 VACC NO PRSV 0.5 ML IM: CPT | Mod: S$GLB,,, | Performed by: FAMILY MEDICINE

## 2024-12-23 PROCEDURE — G0008 ADMIN INFLUENZA VIRUS VAC: HCPCS | Mod: S$GLB,,, | Performed by: FAMILY MEDICINE

## 2024-12-23 PROCEDURE — 3044F HG A1C LEVEL LT 7.0%: CPT | Mod: CPTII,S$GLB,, | Performed by: FAMILY MEDICINE

## 2024-12-23 PROCEDURE — 83036 HEMOGLOBIN GLYCOSYLATED A1C: CPT | Performed by: FAMILY MEDICINE

## 2024-12-23 PROCEDURE — 99999 PR PBB SHADOW E&M-EST. PATIENT-LVL IV: CPT | Mod: PBBFAC,,, | Performed by: FAMILY MEDICINE

## 2024-12-23 PROCEDURE — 1159F MED LIST DOCD IN RCRD: CPT | Mod: CPTII,S$GLB,, | Performed by: FAMILY MEDICINE

## 2024-12-23 PROCEDURE — 80053 COMPREHEN METABOLIC PANEL: CPT | Performed by: FAMILY MEDICINE

## 2024-12-23 PROCEDURE — 3008F BODY MASS INDEX DOCD: CPT | Mod: CPTII,S$GLB,, | Performed by: FAMILY MEDICINE

## 2024-12-23 PROCEDURE — 99214 OFFICE O/P EST MOD 30 MIN: CPT | Mod: S$GLB,,, | Performed by: FAMILY MEDICINE

## 2024-12-23 PROCEDURE — 84443 ASSAY THYROID STIM HORMONE: CPT | Performed by: FAMILY MEDICINE

## 2024-12-23 PROCEDURE — 36415 COLL VENOUS BLD VENIPUNCTURE: CPT | Performed by: FAMILY MEDICINE

## 2024-12-23 PROCEDURE — 85025 COMPLETE CBC W/AUTO DIFF WBC: CPT | Performed by: FAMILY MEDICINE

## 2024-12-23 PROCEDURE — 1160F RVW MEDS BY RX/DR IN RCRD: CPT | Mod: CPTII,S$GLB,, | Performed by: FAMILY MEDICINE

## 2024-12-23 PROCEDURE — 84439 ASSAY OF FREE THYROXINE: CPT | Performed by: FAMILY MEDICINE

## 2024-12-23 NOTE — PROGRESS NOTES
Subjective:      Patient ID: Lynn Green is a 39 y.o. female.    Chief Complaint: Neck Pain    History of Present Illness    CHIEF COMPLAINT:  - Ms. Green presents with dysphagia and neck/throat pain persisting for approximately one month.    HPI:  Ms. Green reports dysphagia for almost a month, specifically with solid foods, not affecting liquids or saliva usually. Pressing on her throat hurts, mostly in the middle. Symptoms have progressed from intermittent to constant, with pain intensity remaining stable. Throat and ear discomfort are present, with significant postnasal drip. Frequent sneezing and symptom exacerbation occur with weather changes, particularly heat. Morning throat clearing is necessary upon waking. Ms. Green has self-medicated with Benadryl but has not tried other antihistamines. Ms. Green denies voice changes, dysphagia with liquids or saliva, food impaction during swallowing, or food-specific swallowing issues. No history of thyroid or throat conditions reported. Ms. Green reports difficulty with weight loss despite dietary efforts so she is concerned about her thyroid. She'd like to see a bariatric surgeon to discuss medical/surgical weight loss options.     MEDICATIONS:  - Oral contraceptives    MEDICAL HISTORY:  - Syphilis (treated)  - Heart murmur during childhood    SURGICAL HISTORY:  -  section  - Cystoscopy    FAMILY HISTORY:  - Mother: Hypertension, Diabetes  - Father: Hypertension, Diabetes    SOCIAL HISTORY:  - Alcohol Use: Occasional wine consumption  - Smoking: Non-smoker  - Occupation: Job involves extensive walking    IMAGING:  - Mammogram: Recent, normal          Review of systems negative except for those noted in above HPI.    Patient Active Problem List   Diagnosis    Dysuria    Microhematuria    Elderly primigravida in second trimester    Intrauterine pregnancy    Syphilis affecting pregnancy in third trimester    Delivery of pregnancy by  section     "      Current Outpatient Medications:     MULTIVITAMIN ORAL, Take 1 tablet by mouth., Disp: , Rfl:     norethindrone-ethinyl estradiol (BLISOVI FE 1/20, 28,) 1 mg-20 mcg (21)/75 mg (7) per tablet, Take 1 tablet by mouth once daily., Disp: 84 tablet, Rfl: 3    Current Facility-Administered Medications:     influenza (Flulaval, Fluzone, Fluarix) 45 mcg/0.5 mL IM vaccine (> or = 6 mo) 0.5 mL, 0.5 mL, Intramuscular, 1 time in Clinic/HOD,     Lab Results   Component Value Date     01/29/2024    K 4.0 01/29/2024     01/29/2024    CO2 24 01/29/2024    GLU 89 01/29/2024    BUN 8 01/29/2024    CREATININE 0.6 01/29/2024    CALCIUM 9.1 01/29/2024    PROT 7.9 01/29/2024    ALBUMIN 3.9 01/29/2024    BILITOT 0.3 01/29/2024    ALKPHOS 68 01/29/2024    AST 15 01/29/2024    ALT 12 01/29/2024    ANIONGAP 8 01/29/2024    ESTGFRAFRICA >60 05/20/2022    EGFRNONAA >60 05/20/2022    WBC 5.16 01/29/2024    HGB 12.8 01/29/2024    HGB 11.6 (L) 05/20/2022    HCT 39.5 01/29/2024    MCV 86 01/29/2024     01/29/2024    TSH 1.501 01/29/2024    HEPCAB Non-reactive 03/07/2023       Lab Results   Component Value Date    HGBA1C 5.7 (H) 01/29/2024    HGBA1C 5.7 04/20/2016     No results found for: "MICALBCREAT"  Lab Results   Component Value Date    LDLCALC 141.6 01/29/2024    LDLCALC 129.2 11/12/2015    CHOL 200 (H) 01/29/2024    HDL 44 01/29/2024    TRIG 72 01/29/2024       Reviewed PMH, PSH, SH, FH and any changes have been updated.    Vitals:    12/23/24 0805   Pulse: 88   SpO2: 97%   Weight: 111.5 kg (245 lb 13 oz)   PainSc: 0-No pain     Objective:   Physical Exam    General: Pleasant. No acute distress. Well-developed. Well-nourished.  Eyes: EOMBI. Sclerae anicteric.  HENT: Normocephalic. Atraumatic. Nares patent. Moist oral mucosa. Large tonsils bilaterally; no tonsillar exudate  Ears: Bilateral TM clear, canals normal.  Neck: No goiter noted. No overlying skin abnormality. TTP over midline throat.  Cardiovascular: Regular " rate and rhythm. No murmurs. No gallops. No rubs. Normal S1 and S2.  Respiratory: Normal respiratory effort. Clear to auscultation bilaterally. No rales. No rhonchi. No wheezing.  Abdomen: Soft. Nontender. Nondistended. Normoactive bowel sounds.  Musculoskeletal: No obvious deformity. Normal range of motion.  Extremities: No lower extremity edema.  Neurological: Alert and lucid. Normal gait. No gross deficits.  Psychiatric: Normal mood. Normal affect. Good insight. Good judgment.  Skin: Warm. Intact.          Assessment:         ICD-10-CM ICD-9-CM   1. Neck pain  M54.2 723.1   2. Class 3 severe obesity with body mass index (BMI) of 40.0 to 44.9 in adult, unspecified obesity type, unspecified whether serious comorbidity present  E66.813 278.01    E66.01 V85.41    Z68.41    3. Immunization counseling  Z71.85 V65.49        Plan:   Assessment & Plan     Considered thyroid issue vs. throat problem due to drainage as potential causes of patient's swallowing difficulty and neck pain   Ordered thyroid function tests and other labs to rule out thyroid issues   Recommend allergy medication trial to address potential post-nasal drip causing throat discomfort   Ordered thyroid ultrasound to further evaluate neck pain and swallowing issues   Planned stepwise approach: allergy medication trial, ultrasound results review, then ENT referral if symptoms persist   Considered GI referral for further evaluation of esophagus if ENT findings are unremarkable   Reviewed recent normal mammogram results    NECK PAIN AND SWALLOWING DIFFICULTY:  - Educated on potential causes of neck pain and swallowing difficulty, including thyroid issues, allergies, and esophageal problems.  - Blood work including thyroid function tests and blood sugar ordered.  - Thyroid ultrasound ordered.  - Referred to Ear, Nose, and Throat (ENT) specialist.    ALLERGIC RHINITIS:  - Started OTC allergy medication trial: Choose 1 from Claritin, Zyrtec, Allegra, or Xyzal;  take chosen medication with Flonase nasal spray daily for 1-2 weeks; if no improvement, continue Flonase and switch to a different allergy medication for another 1-2 weeks.    OBESITY:  - Referred to bariatric clinic for weight management consultation.    PREVENTIVE CARE:  - Flu shot administered in office.    Neck pain  -     TSH; Future; Expected date: 12/23/2024  -     T4, FREE; Future; Expected date: 12/23/2024  -     CBC Auto Differential; Future  -     COMPREHENSIVE METABOLIC PANEL; Future; Expected date: 12/23/2024  -     US Soft Tissue Head Neck; Future; Expected date: 12/23/2024  -     Ambulatory referral/consult to ENT; Future; Expected date: 12/30/2024    Class 3 severe obesity with body mass index (BMI) of 40.0 to 44.9 in adult, unspecified obesity type, unspecified whether serious comorbidity present  -     Ambulatory referral/consult to Bariatric/Obesity Medicine; Future; Expected date: 12/30/2024  -     HEMOGLOBIN A1C; Future; Expected date: 12/23/2024    Immunization counseling  -     influenza (Flulaval, Fluzone, Fluarix) 45 mcg/0.5 mL IM vaccine (> or = 6 mo) 0.5 mL        Chronic conditions status updated as per HPI.  Other than changes above, cont current medications and maintain follow up with specialists.      Follow up in about 4 weeks (around 1/20/2025), or if symptoms worsen or fail to improve.         Reshma Prado MD  [facility]    This note was generated with the assistance of ambient listening technology. Verbal consent was obtained by the patient and accompanying visitor(s) for the recording of patient appointment to facilitate this note. I attest to having reviewed and edited the generated note for accuracy, though some syntax or spelling errors may persist. Please contact the author of this note for any clarification.       There are no Patient Instructions on file for this visit.  All of your core healthy metrics are met.

## 2024-12-23 NOTE — PROGRESS NOTES
After obtaining consent, and per orders of Dr. Prado, injection of  FLUARIX  Lot [id number]  Exp 06/30/2025  given in the RD by Keyur Yusuf LPN. Patient tolerated well and band aid applied. Patient instructed to remain in clinic for 15 minutes afterwards, and to report any adverse reaction to me immediately.

## 2024-12-23 NOTE — TELEPHONE ENCOUNTER
Left voice message for patient to return call to schedule appointment from referral to Bariatrics department. Contact number provided, and My Chart message to be sent.  Hina SIDDIQUI [phone]

## 2024-12-24 ENCOUNTER — HOSPITAL ENCOUNTER (OUTPATIENT)
Dept: RADIOLOGY | Facility: OTHER | Age: 39
Discharge: HOME | End: 2024-12-24
Attending: FAMILY MEDICINE
Payer: COMMERCIAL

## 2024-12-24 DIAGNOSIS — M54.2 NECK PAIN: ICD-10-CM

## 2024-12-24 PROCEDURE — 76536 US EXAM OF HEAD AND NECK: CPT | Mod: 26,,, | Performed by: RADIOLOGY

## 2024-12-24 PROCEDURE — 76536 US EXAM OF HEAD AND NECK: CPT | Mod: TC

## 2025-03-03 ENCOUNTER — OFFICE VISIT (OUTPATIENT)
Dept: INTERNAL MEDICINE | Facility: CLINIC | Age: 40
End: 2025-03-03
Payer: COMMERCIAL

## 2025-03-03 VITALS
OXYGEN SATURATION: 98 % | HEIGHT: 64 IN | SYSTOLIC BLOOD PRESSURE: 127 MMHG | DIASTOLIC BLOOD PRESSURE: 65 MMHG | HEART RATE: 78 BPM | WEIGHT: 250.25 LBS | BODY MASS INDEX: 42.72 KG/M2

## 2025-03-03 DIAGNOSIS — R73.01 IMPAIRED FASTING GLUCOSE: ICD-10-CM

## 2025-03-03 DIAGNOSIS — R09.82 POST-NASAL DRIP: Primary | ICD-10-CM

## 2025-03-03 DIAGNOSIS — E66.01 MORBID OBESITY: ICD-10-CM

## 2025-03-03 PROCEDURE — 1159F MED LIST DOCD IN RCRD: CPT | Mod: CPTII,S$GLB,, | Performed by: FAMILY MEDICINE

## 2025-03-03 PROCEDURE — 3008F BODY MASS INDEX DOCD: CPT | Mod: CPTII,S$GLB,, | Performed by: FAMILY MEDICINE

## 2025-03-03 PROCEDURE — 99213 OFFICE O/P EST LOW 20 MIN: CPT | Mod: S$GLB,,, | Performed by: FAMILY MEDICINE

## 2025-03-03 PROCEDURE — 3074F SYST BP LT 130 MM HG: CPT | Mod: CPTII,S$GLB,, | Performed by: FAMILY MEDICINE

## 2025-03-03 PROCEDURE — 3078F DIAST BP <80 MM HG: CPT | Mod: CPTII,S$GLB,, | Performed by: FAMILY MEDICINE

## 2025-03-03 PROCEDURE — 99999 PR PBB SHADOW E&M-EST. PATIENT-LVL III: CPT | Mod: PBBFAC,,, | Performed by: FAMILY MEDICINE

## 2025-03-03 PROCEDURE — 1160F RVW MEDS BY RX/DR IN RCRD: CPT | Mod: CPTII,S$GLB,, | Performed by: FAMILY MEDICINE

## 2025-03-03 NOTE — PROGRESS NOTES
Subjective:      Patient ID: Lynn Green is a 39 y.o. female.    Chief Complaint: follow up throat drainage and swallowing issues    History of Present Illness    CHIEF COMPLAINT:  - Ms. Green presents for a follow-up visit to discuss the resolution of a previously reported swallowing issue and to review recent lab results.    HPI:  Ms. Green reports improvement in her previously mentioned swallowing issue after starting to take Claritin (loratadine) every morning. She has been using Flonase nasal spray, and notices that her nose burns when going outside during high pollen days. She downloaded an shay to track these symptoms and noticed a correlation between the burning sensation and certain days.     She mentions having a gynecologist, Dr. Nichloas, who manages her mammograms and pap smears. She has not yet received her flu vaccine for the current season.     Regarding the bariatric surgery referral, she reports having received a call for financial matters but is still waiting for an appointment for the actual office visit. She denies needing to see an ENT specialist for the swallowing issue or having diabetes.    MEDICATIONS:  - Claritin (Loratadine), taken every morning, for allergy symptoms, effective for adrenergic swallowing issue  - Flonase (Fluticasone), nasal spray, for allergy symptoms, can cause nose dryness and nosebleeds    MEDICAL HISTORY:  - At-risk for diabetes: Ms. Green has been in the at-risk range (A1c 5.7 to 6.4) for approximately 8 years  - History of low blood count (anemia): Currently resolved  - Tetanus shot received in 2019    TEST RESULTS:  - A1c: December, 5.7-6.4 range (at-risk range), not diabetes but remained in at-risk range  - Electrolytes: December, normal  - Kidney function: December, great  - Liver function: December, great  - Blood count: December, normal, no major infection  - Thyroid: December, normal    IMAGING:  - Mammogram: December, normal         Current  "Medications[1]  Review of patient's allergies indicates:   Allergen Reactions    Sulfamethoxazole-trimethoprim Hives and Other (See Comments)     Hives (skin)^        Problem List[2]     Review of systems negative except for those noted in above HPI.    Lab Results   Component Value Date     12/23/2024    K 4.1 12/23/2024     12/23/2024    CO2 22 (L) 12/23/2024    GLU 93 12/23/2024    BUN 6 12/23/2024    CREATININE 0.6 12/23/2024    CALCIUM 9.2 12/23/2024    PROT 7.6 12/23/2024    ALBUMIN 3.7 12/23/2024    BILITOT 0.3 12/23/2024    ALKPHOS 82 12/23/2024    AST 13 12/23/2024    ALT 10 12/23/2024    ANIONGAP 10 12/23/2024    ESTGFRAFRICA >60 05/20/2022    EGFRNONAA >60 05/20/2022    WBC 5.21 12/23/2024    HGB 12.8 12/23/2024    HGB 12.8 01/29/2024    HCT 39.4 12/23/2024    MCV 86 12/23/2024     12/23/2024    TSH 1.330 12/23/2024    HEPCAB Non-reactive 03/07/2023       Lab Results   Component Value Date    HGBA1C 5.7 (H) 12/23/2024    HGBA1C 5.7 (H) 01/29/2024    HGBA1C 5.7 04/20/2016     No results found for: "MICALBCREAT"  Lab Results   Component Value Date    LDLCALC 141.6 01/29/2024    LDLCALC 129.2 11/12/2015    CHOL 200 (H) 01/29/2024    HDL 44 01/29/2024    TRIG 72 01/29/2024       Reviewed PMH, PSH, SH, FH and any changes have been updated.    Vitals:    03/03/25 0945   BP: 127/65   Pulse: 78   SpO2: 98%   Weight: 113.5 kg (250 lb 3.6 oz)   Height: 5' 4" (1.626 m)   PainSc: 0-No pain     Objective:   Physical Exam    General: Pleasant. No acute distress. Well-developed. Well-nourished.  Eyes: EOMBI. Sclerae anicteric.  HENT: Normocephalic. Atraumatic. Nares patent. Moist oral mucosa.  Cardiovascular: Regular rate and rhythm. No murmurs. No gallops. No rubs. Normal S1 and S2.  Respiratory: Normal respiratory effort. Clear to auscultation bilaterally. No rales. No rhonchi. No wheezing.  Abdomen: Soft. Nontender. Nondistended. Normoactive bowel sounds.  Musculoskeletal: No obvious deformity. " Normal range of motion.  Extremities: No lower extremity edema.  Neurological: Alert and lucid. Normal gait. No gross deficits.  Psychiatric: Normal mood. Normal affect. Good insight. Good judgment.  Skin: Warm. Intact.         Assessment:         ICD-10-CM ICD-9-CM   1. Post-nasal drip  R09.82 784.91   2. Impaired fasting glucose  R73.01 790.21   3. Morbid obesity  E66.01 278.01        Plan:   Assessment & Plan     Assessed patient's swallowing issue, which improved with Claritin (loratadine)   Reviewed recent lab results:   - A1c remains in the at-risk range for diabetes (5.7-6.4)   - Thyroid function normal   - Previously low blood count improved   - Kidney and liver function normal    IMPAIRED FASTING GLUCOSE: Stable.  - Reviewed labs; discussed low carb diet and increase regular exercise.  - Recommend engaging in regular exercise, such as a 30-minute walk 5 days a week.  - Follow up with the bariatric surgeon's office regarding the pending appointment.    ALLERGIC RHINITIS, POST-NASAL DRIP: Improved.  - Continue Claritin and Flonase as needed.        Post-nasal drip    Impaired fasting glucose    Morbid obesity        Chronic conditions status updated as per HPI.  Other than changes above, cont current medications and maintain follow up with specialists.      Follow up if symptoms worsen or fail to improve.         Reshma Prado MD  [facility]    This note was generated with the assistance of ambient listening technology. Verbal consent was obtained by the patient and accompanying visitor(s) for the recording of patient appointment to facilitate this note. I attest to having reviewed and edited the generated note for accuracy, though some syntax or spelling errors may persist. Please contact the author of this note for any clarification.       There are no Patient Instructions on file for this visit.  All of your core healthy metrics are met.                                   [1]   Current  Outpatient Medications:     MULTIVITAMIN ORAL, Take 1 tablet by mouth., Disp: , Rfl:     norethindrone-ethinyl estradiol (BLISOVI FE , ,) 1 mg-20 mcg (21)/75 mg (7) per tablet, Take 1 tablet by mouth once daily., Disp: 84 tablet, Rfl: 3  [2]   Patient Active Problem List  Diagnosis    Dysuria    Microhematuria    Elderly primigravida in second trimester    Intrauterine pregnancy    Syphilis affecting pregnancy in third trimester    Delivery of pregnancy by  section

## 2025-05-04 ENCOUNTER — OFFICE VISIT (OUTPATIENT)
Dept: URGENT CARE | Facility: CLINIC | Age: 40
End: 2025-05-04
Payer: COMMERCIAL

## 2025-05-04 VITALS
SYSTOLIC BLOOD PRESSURE: 126 MMHG | HEART RATE: 96 BPM | BODY MASS INDEX: 42.68 KG/M2 | OXYGEN SATURATION: 99 % | HEIGHT: 64 IN | DIASTOLIC BLOOD PRESSURE: 84 MMHG | TEMPERATURE: 99 F | WEIGHT: 250 LBS | RESPIRATION RATE: 18 BRPM

## 2025-05-04 DIAGNOSIS — R05.9 COUGH, UNSPECIFIED TYPE: Primary | ICD-10-CM

## 2025-05-04 DIAGNOSIS — J06.9 VIRAL URI WITH COUGH: ICD-10-CM

## 2025-05-04 DIAGNOSIS — R09.81 CONGESTION OF NASAL SINUS: ICD-10-CM

## 2025-05-04 LAB
CTP QC/QA: YES
CTP QC/QA: YES
POC MOLECULAR INFLUENZA A AGN: NEGATIVE
POC MOLECULAR INFLUENZA B AGN: NEGATIVE
SARS CORONAVIRUS 2 ANTIGEN: NEGATIVE

## 2025-05-04 PROCEDURE — 87502 INFLUENZA DNA AMP PROBE: CPT | Mod: QW,S$GLB,,

## 2025-05-04 PROCEDURE — 87811 SARS-COV-2 COVID19 W/OPTIC: CPT | Mod: QW,S$GLB,,

## 2025-05-04 PROCEDURE — 99214 OFFICE O/P EST MOD 30 MIN: CPT | Mod: S$GLB,,,

## 2025-05-04 RX ORDER — PROMETHAZINE HYDROCHLORIDE AND DEXTROMETHORPHAN HYDROBROMIDE 6.25; 15 MG/5ML; MG/5ML
5 SYRUP ORAL EVERY 4 HOURS PRN
Qty: 180 ML | Refills: 0 | Status: SHIPPED | OUTPATIENT
Start: 2025-05-04 | End: 2025-05-11

## 2025-05-04 RX ORDER — BENZONATATE 200 MG/1
200 CAPSULE ORAL 3 TIMES DAILY PRN
Qty: 30 CAPSULE | Refills: 0 | Status: SHIPPED | OUTPATIENT
Start: 2025-05-04 | End: 2025-05-14

## 2025-05-04 NOTE — LETTER
May 4, 2025      [facility] - [address]  [place] [address]  Phone: [phone]  Fax: [phone]       Patient: Lynn Green   YOB: 1985  Date of Visit: 05/04/2025    To Whom It May Concern:    Harry Green  was at [facility] on 05/04/2025. The patient may return to work/school on 05/05/2025 with no restrictions. If you have any questions or concerns, or if I can be of further assistance, please do not hesitate to contact me.    Sincerely,    Andria Waters PA-C

## 2025-05-04 NOTE — PROGRESS NOTES
"Subjective:      Patient ID: Lynn Green is a 39 y.o. female.    Vitals:  height is 5' 4" (1.626 m) and weight is 113.4 kg (250 lb). Her oral temperature is 98.5 °F (36.9 °C). Her blood pressure is 126/84 and her pulse is 96. Her respiration is 18 and oxygen saturation is 99%.     Chief Complaint: Cough    This is a 39 y.o. female who presents today with a chief complaint of  cough, congestion, body aches, chills, diarrhea new onset symptoms  started on yesterday.Runny nose, sore throat.     Cough  The current episode started yesterday. The problem has been unchanged. Associated symptoms include chills, headaches, nasal congestion, rhinorrhea and a sore throat. The treatment provided no relief.       Constitution: Positive for chills.   HENT:  Positive for sore throat.    Respiratory:  Positive for cough.    Neurological:  Positive for headaches.      Objective:     Physical Exam   Constitutional: She is oriented to person, place, and time. She appears well-developed. She is cooperative.  Non-toxic appearance. She does not appear ill. No distress.      Comments:Patient sits comfortably in exam chair. Answers questions in complete sentences. Does not show any signs of distress or discoloration.        HENT:   Head: Normocephalic and atraumatic.   Ears:   Right Ear: Hearing, tympanic membrane, external ear and ear canal normal. impacted cerumen (partial. TM still visualized)  Left Ear: Hearing, tympanic membrane, external ear and ear canal normal. no impacted cerumen  Nose: Rhinorrhea and congestion present. No mucosal edema or nasal deformity. No epistaxis. Right sinus exhibits no maxillary sinus tenderness and no frontal sinus tenderness. Left sinus exhibits no maxillary sinus tenderness and no frontal sinus tenderness.   Mouth/Throat: Uvula is midline and mucous membranes are normal. No trismus in the jaw. Normal dentition. No uvula swelling. Posterior oropharyngeal erythema present. No oropharyngeal exudate or " posterior oropharyngeal edema. No tonsillar exudate.   Eyes: Conjunctivae and lids are normal. No scleral icterus.   Neck: Trachea normal and phonation normal. Neck supple. No edema present. No erythema present. No neck rigidity present.   Cardiovascular: Normal rate, regular rhythm, normal heart sounds and normal pulses.   Pulmonary/Chest: Effort normal and breath sounds normal. No stridor. No respiratory distress. She has no decreased breath sounds. She has no wheezes. She has no rhonchi. She has no rales.   Abdominal: Normal appearance.   Musculoskeletal: Normal range of motion.         General: No deformity. Normal range of motion.   Neurological: She is alert and oriented to person, place, and time. She exhibits normal muscle tone. Coordination normal.   Skin: Skin is warm, dry, intact, not diaphoretic and not pale.   Psychiatric: Her speech is normal and behavior is normal. Judgment and thought content normal.   Nursing note and vitals reviewed.    Results for orders placed or performed in visit on 05/04/25   SARS Coronavirus 2 Antigen, POCT Manual Read    Collection Time: 05/04/25  1:58 PM   Result Value Ref Range    SARS Coronavirus 2 Antigen Negative Negative, Presumptive Negative     Acceptable Yes    POCT Influenza A/B MOLECULAR    Collection Time: 05/04/25  1:58 PM   Result Value Ref Range    POC Molecular Influenza A Ag Negative Negative    POC Molecular Influenza B Ag Negative Negative     Acceptable Yes        Assessment:     1. Cough, unspecified type    2. Congestion of nasal sinus    3. Viral URI with cough        Plan:       Cough, unspecified type  -     POCT Influenza A/B MOLECULAR    Congestion of nasal sinus  -     SARS Coronavirus 2 Antigen, POCT Manual Read    Viral URI with cough  -     promethazine-dextromethorphan (PROMETHAZINE-DM) 6.25-15 mg/5 mL Syrp; Take 5 mLs by mouth every 4 (four) hours as needed (cough).  Dispense: 180 mL; Refill: 0  -     benzonatate  (TESSALON) 200 MG capsule; Take 1 capsule (200 mg total) by mouth 3 (three) times daily as needed for Cough.  Dispense: 30 capsule; Refill: 0                  Patient Instructions   - Rest.    - Drink plenty of fluids. Increasing your fluid intake will help loosen up mucous.  - Viral upper respiratory infections typically run their course in 10-14 days.      CONGESTION:  - Plain Mucinex to help thin mucous. Drinking plenty of water can have the same effect.   - You can take over-the-counter claritin, zyrtec, allegra, OR xyzal as directed. These are antihistamines that can help with runny nose, nasal congestion, sneezing, and helps to dry up post-nasal drip, which usually causes sore throat and cough.  - If you do NOT have high blood pressure, you may use a decongestant form (D)  of this medication (ie. Claritin- D, zyrtec-D, allegra-D) or if you do not take the D form, you can take sudafed (pseudoephedrine) over the counter, which is a decongestant. Do NOT take two decongestant (D) medications at the same time (such as mucinex-D and claritin-D or plain sudafed and claritin D). Dextromethorphan (DM) is a cough suppressant over the counter (ie. mucinex DM, robitussin, delsym; dayquil/nyquil has DM as well.)   - You can use Flonase (fluticasone) nasal spray as directed for sinus congestion and postnasal drip. This is a steroid nasal spray that works locally over time to decrease the inflammation in your nose/sinuses and help with allergic symptoms. This is not an quick- relief spray like afrin, but it works well if used daily.  Discontinue if you develop nose bleed  - Use nasal saline prior to Flonase.  - Use Ocean Spray Nasal Saline 1-3 puffs each nostril every 2-3 hours then blow out onto tissue. This is to irrigate the nasal passage way to clear the sinus openings. Use until sinus problem resolved.  - You can also try NasalCrom nasal spray, which has been shown to help reduce duration of symptoms when started within  24 hours of symptom onset. Okay to use with Flonase and other allergy medications.   - A Neti Pot with sterile saline can help break up nasal congestion and give relief.     COUGH:  - You can take Delsym to help with cough.     SORE THROAT:   - Chloraseptic throat spray can help numb the throat.   - Warm salt water gargles can help with sore throat.  - Warm tea with honey can help with sore throat and cough. Honey is a natural cough suppressant.     - Rest.    - Drink plenty of fluids.    - Acetaminophen (tylenol) or Ibuprofen (advil,motrin) as directed as needed for fever/pain. Avoid tylenol if you have a history of liver disease. Do not take ibuprofen if you have a history of GI bleeding, kidney disease, or if you take blood thinners.   - Ibuprofen dosing for adults: 400 mg by mouth every 4-6 hours as needed. Max: 2400 mg/day; Info: use lowest effective dose, shortest effective treatment duration; give w/ food if GI upset occurs.  - Tylenol dosing for adults: [By mouth route, immediate-release form] Dose: 325-1000 mg by mouth every 4-6h as needed; Max: 1 g/4h and 4 g/day from all sources. [By mouth route, extended-release form] Dose: 650-1300 mg Extended Release by mouth every 8h as needed; Max: 4 g/day from all sources.     - You must understand that you have received an Urgent Care treatment only and that you may be released before all of your medical problems are known or treated.   - You, the patient, will arrange for follow up care as instructed.   - If your condition worsens or fails to improve we recommend that you receive another evaluation at the ER immediately or contact your PCP to discuss your concerns or return here.   - Follow up with your PCP or specialty clinic as directed in the next 1-2 weeks if not improved or as needed.  You can call [phone] to schedule an appointment with the appropriate provider.    If your symptoms do not improve or worsen, go to the emergency room immediately.

## 2025-05-04 NOTE — PATIENT INSTRUCTIONS
- Rest.    - Drink plenty of fluids. Increasing your fluid intake will help loosen up mucous.  - Viral upper respiratory infections typically run their course in 10-14 days.      CONGESTION:  - Plain Mucinex to help thin mucous. Drinking plenty of water can have the same effect.   - You can take over-the-counter claritin, zyrtec, allegra, OR xyzal as directed. These are antihistamines that can help with runny nose, nasal congestion, sneezing, and helps to dry up post-nasal drip, which usually causes sore throat and cough.  - If you do NOT have high blood pressure, you may use a decongestant form (D)  of this medication (ie. Claritin- D, zyrtec-D, allegra-D) or if you do not take the D form, you can take sudafed (pseudoephedrine) over the counter, which is a decongestant. Do NOT take two decongestant (D) medications at the same time (such as mucinex-D and claritin-D or plain sudafed and claritin D). Dextromethorphan (DM) is a cough suppressant over the counter (ie. mucinex DM, robitussin, delsym; dayquil/nyquil has DM as well.)   - You can use Flonase (fluticasone) nasal spray as directed for sinus congestion and postnasal drip. This is a steroid nasal spray that works locally over time to decrease the inflammation in your nose/sinuses and help with allergic symptoms. This is not an quick- relief spray like afrin, but it works well if used daily.  Discontinue if you develop nose bleed  - Use nasal saline prior to Flonase.  - Use Ocean Spray Nasal Saline 1-3 puffs each nostril every 2-3 hours then blow out onto tissue. This is to irrigate the nasal passage way to clear the sinus openings. Use until sinus problem resolved.  - You can also try NasalCrom nasal spray, which has been shown to help reduce duration of symptoms when started within 24 hours of symptom onset. Okay to use with Flonase and other allergy medications.   - A Neti Pot with sterile saline can help break up nasal congestion and give relief.      COUGH:  - You can take Delsym to help with cough.     SORE THROAT:   - Chloraseptic throat spray can help numb the throat.   - Warm salt water gargles can help with sore throat.  - Warm tea with honey can help with sore throat and cough. Honey is a natural cough suppressant.     - Rest.    - Drink plenty of fluids.    - Acetaminophen (tylenol) or Ibuprofen (advil,motrin) as directed as needed for fever/pain. Avoid tylenol if you have a history of liver disease. Do not take ibuprofen if you have a history of GI bleeding, kidney disease, or if you take blood thinners.   - Ibuprofen dosing for adults: 400 mg by mouth every 4-6 hours as needed. Max: 2400 mg/day; Info: use lowest effective dose, shortest effective treatment duration; give w/ food if GI upset occurs.  - Tylenol dosing for adults: [By mouth route, immediate-release form] Dose: 325-1000 mg by mouth every 4-6h as needed; Max: 1 g/4h and 4 g/day from all sources. [By mouth route, extended-release form] Dose: 650-1300 mg Extended Release by mouth every 8h as needed; Max: 4 g/day from all sources.     - You must understand that you have received an Urgent Care treatment only and that you may be released before all of your medical problems are known or treated.   - You, the patient, will arrange for follow up care as instructed.   - If your condition worsens or fails to improve we recommend that you receive another evaluation at the ER immediately or contact your PCP to discuss your concerns or return here.   - Follow up with your PCP or specialty clinic as directed in the next 1-2 weeks if not improved or as needed.  You can call [phone] to schedule an appointment with the appropriate provider.    If your symptoms do not improve or worsen, go to the emergency room immediately.

## 2025-05-24 DIAGNOSIS — Z30.41 ENCOUNTER FOR SURVEILLANCE OF CONTRACEPTIVE PILLS: ICD-10-CM

## 2025-05-25 RX ORDER — NORETHINDRONE ACETATE AND ETHINYL ESTRADIOL 1MG-20(21)
1 KIT ORAL
Qty: 84 TABLET | Refills: 0 | Status: SHIPPED | OUTPATIENT
Start: 2025-05-25

## 2025-05-25 NOTE — TELEPHONE ENCOUNTER
Refill Decision Note   Lynn Green  is requesting a refill authorization.  Brief Assessment and Rationale for Refill:  Approve     Medication Therapy Plan:  FOV in 1 month      Comments:     Note composed:8:38 AM 05/25/2025

## 2025-06-09 ENCOUNTER — TELEPHONE (OUTPATIENT)
Dept: INTERNAL MEDICINE | Facility: CLINIC | Age: 40
End: 2025-06-09
Payer: COMMERCIAL

## 2025-07-01 ENCOUNTER — OFFICE VISIT (OUTPATIENT)
Dept: OBSTETRICS AND GYNECOLOGY | Facility: CLINIC | Age: 40
End: 2025-07-01
Payer: COMMERCIAL

## 2025-07-01 ENCOUNTER — LAB VISIT (OUTPATIENT)
Dept: LAB | Facility: HOSPITAL | Age: 40
End: 2025-07-01
Attending: STUDENT IN AN ORGANIZED HEALTH CARE EDUCATION/TRAINING PROGRAM
Payer: COMMERCIAL

## 2025-07-01 VITALS
BODY MASS INDEX: 41.77 KG/M2 | WEIGHT: 244.69 LBS | SYSTOLIC BLOOD PRESSURE: 124 MMHG | HEIGHT: 64 IN | DIASTOLIC BLOOD PRESSURE: 86 MMHG

## 2025-07-01 DIAGNOSIS — Z11.3 SCREENING EXAMINATION FOR STI: ICD-10-CM

## 2025-07-01 DIAGNOSIS — Z30.41 ENCOUNTER FOR SURVEILLANCE OF CONTRACEPTIVE PILLS: ICD-10-CM

## 2025-07-01 DIAGNOSIS — Z01.419 ENCOUNTER FOR GYNECOLOGICAL EXAMINATION: Primary | ICD-10-CM

## 2025-07-01 DIAGNOSIS — Z12.39 ENCOUNTER FOR SCREENING FOR MALIGNANT NEOPLASM OF BREAST, UNSPECIFIED SCREENING MODALITY: ICD-10-CM

## 2025-07-01 PROCEDURE — 87591 N.GONORRHOEAE DNA AMP PROB: CPT | Performed by: STUDENT IN AN ORGANIZED HEALTH CARE EDUCATION/TRAINING PROGRAM

## 2025-07-01 PROCEDURE — 86593 SYPHILIS TEST NON-TREP QUANT: CPT

## 2025-07-01 PROCEDURE — 86592 SYPHILIS TEST NON-TREP QUAL: CPT

## 2025-07-01 PROCEDURE — 86803 HEPATITIS C AB TEST: CPT

## 2025-07-01 PROCEDURE — 36415 COLL VENOUS BLD VENIPUNCTURE: CPT

## 2025-07-01 PROCEDURE — 99999 PR PBB SHADOW E&M-EST. PATIENT-LVL III: CPT | Mod: PBBFAC,,, | Performed by: STUDENT IN AN ORGANIZED HEALTH CARE EDUCATION/TRAINING PROGRAM

## 2025-07-01 PROCEDURE — 87389 HIV-1 AG W/HIV-1&-2 AB AG IA: CPT

## 2025-07-01 PROCEDURE — 87340 HEPATITIS B SURFACE AG IA: CPT

## 2025-07-01 RX ORDER — NORETHINDRONE ACETATE AND ETHINYL ESTRADIOL 1MG-20(21)
1 KIT ORAL DAILY
Qty: 84 TABLET | Refills: 3 | Status: SHIPPED | OUTPATIENT
Start: 2025-07-01

## 2025-07-01 NOTE — PROGRESS NOTES
"Chief Complaint: Well Woman Exam     HPI:      Lynn Green is a 39 y.o.  who presents today for well woman exam.  Regular cycles with OCP.  LMP: Patient's last menstrual period was 2025 (exact date).    Today patient's GYN complaints include: none.  Specifically, patient denies abnormal vaginal bleeding, discharge, pelvic pain, urinary problems.    Ms. Green is currently sexually active with a single male partner. She is currently using oral contraceptives (estrogen/progesterone) for contraception. She would like STD screening today.    Previous Pap: NILM, HPV negative (3/12/2023)  Previous Mammogram: BiRads: 1 T-C Score: 10.9 (12/10/24)     Past Medical History:   Diagnosis Date    Allergy     Cystitis     Heart murmur     as a child    History of hematuria     Kidney stones     Kidney stones     Positive RPR test 10/18/2019    Urinary tract infection        Past Surgical History:   Procedure Laterality Date     SECTION N/A 10/19/2019    Procedure:  SECTION;  Surgeon: Hollie Sandra MD;  Location: [facility] L[facility];  Service: OB/GYN;  Laterality: N/A;    CYSTOSCOPY         Social History[1]    Family History   Problem Relation Name Age of Onset    Diabetes Mother      Hypertension Mother      Hypertension Father      Prostate cancer Father      Cancer Father          bladder cancer    Colon cancer Father      Hypertension Sister      Breast cancer Neg Hx      Ovarian cancer Neg Hx      Kidney disease Neg Hx      Stroke Neg Hx         Review of patient's allergies indicates:   Allergen Reactions    Sulfamethoxazole-trimethoprim Hives and Other (See Comments)     Hives (skin)^       OB History          1    Para   1    Term   1            AB        Living   1         SAB        IAB        Ectopic        Multiple   0    Live Births   1                 Physical Exam:      PHYSICAL EXAM:  /86   Ht 5' 4" (1.626 m)   Wt 111 kg (244 lb 11.4 oz)   LMP 2025 (Exact " Date)   BMI 42.00 kg/m²   Body mass index is 42 kg/m².     APPEARANCE: Well nourished, well developed, in no acute distress.  PSYCH: Appropriate mood and affect.  SKIN: No acne or hirsutism  NECK: Neck symmetric without masses  NODES: No inguinal, axillary, or supraclavicular lymph node enlargement  ABDOMEN: Soft.  No tenderness or masses.    CARDIOVASCULAR: No edema of peripheral extremities  BREASTS: Symmetrical, no visible skin lesions. No palpable masses. No nipple discharge bilaterally.  PELVIC: Normal external genitalia without lesions.  Normal hair distribution.  Adequate perineal body, normal urethral meatus.  Vagina moist and well rugated. Without lesions. With normal discharge.  Cervix pink, without lesions, discharge or tenderness.  No significant cystocele or rectocele.  Bimanual exam shows uterus to be normal size, regular, mobile and nontender.  Adnexa without masses or tenderness.      Assessment/Plan:     Encounter for gynecological examination    Encounter for surveillance of contraceptive pills  -     norethindrone-ethinyl estradiol (BLISOVI FE 1/20, 28,) 1 mg-20 mcg (21)/75 mg (7) per tablet; Take 1 tablet by mouth once daily.  Dispense: 84 tablet; Refill: 3    Screening examination for STI  -     C. trachomatis/N. gonorrhoeae by AMP DNA  -     Hepatitis B Surface Antigen; Future; Expected date: 07/01/2025  -     HIV 1/2 Ag/Ab (4th Gen); Future; Expected date: 07/01/2025  -     Hepatitis C Antibody; Future; Expected date: 07/01/2025  -     RPR (for monitoring); Future; Expected date: 07/01/2025    Encounter for screening for malignant neoplasm of breast, unspecified screening modality  -     Mammo Digital Screening Bilat w/ Ramón (XPD); Future; Expected date: 07/01/2026      - pelvic exam normal  - pap/hpv due 3/2026  - MMG due 12/2025, ordered  - STI labs today and gc/ct collected  - no contraindication to OCP, refill to pharmacy  - RTC 1 yr or sooner prn      Counseling:     Patient was  counseled today on current ASCCP pap guidelines, the recommendation for yearly physical exams, safe driving habits, breast self awareness and annual mammograms. She is to see her PCP for other health maintenance.     Use of the [email or web address] Patient Portal discussed and encouraged during today's visit.          [1]   Social History  Socioeconomic History    Marital status: Single   Occupational History     Employer: [employer]   Tobacco Use    Smoking status: Never    Smokeless tobacco: Never   Substance and Sexual Activity    Alcohol use: Yes     Comment: socially    Drug use: No    Sexual activity: Yes     Partners: Male     Birth control/protection: None, Condom     Comment: single     Social Drivers of Health     Financial Resource Strain: Low Risk  (6/30/2025)    Overall Financial Resource Strain (CARDIA)     Difficulty of Paying Living Expenses: Not very hard   Food Insecurity: No Food Insecurity (6/30/2025)    Hunger Vital Sign     Worried About Running Out of Food in the Last Year: Never true     Ran Out of Food in the Last Year: Never true   Transportation Needs: No Transportation Needs (6/30/2025)    PRAPARE - Transportation     Lack of Transportation (Medical): No     Lack of Transportation (Non-Medical): No   Physical Activity: Insufficiently Active (6/30/2025)    Exercise Vital Sign     Days of Exercise per Week: 2 days     Minutes of Exercise per Session: 30 min   Stress: Stress Concern Present (6/30/2025)    [employer] - Occupational Stress Questionnaire     Feeling of Stress : To some extent   Housing Stability: Low Risk  (6/30/2025)    Housing Stability Vital Sign     Unable to Pay for Housing in the Last Year: No     Number of Times Moved in the Last Year: 0     Homeless in the Last Year: No

## 2025-07-02 LAB
RPR SER QL: REACTIVE
RPR SER-TITR: ABNORMAL {TITER}

## 2025-07-05 ENCOUNTER — RESULTS FOLLOW-UP (OUTPATIENT)
Dept: OBSTETRICS AND GYNECOLOGY | Facility: CLINIC | Age: 40
End: 2025-07-05
Payer: COMMERCIAL

## 2025-07-05 LAB
C TRACH DNA SPEC QL NAA+PROBE: NOT DETECTED
CTGC SOURCE (OHS) ORD-325: NORMAL
N GONORRHOEA DNA UR QL NAA+PROBE: NOT DETECTED

## 2025-07-07 ENCOUNTER — RESULTS FOLLOW-UP (OUTPATIENT)
Dept: OBSTETRICS AND GYNECOLOGY | Facility: CLINIC | Age: 40
End: 2025-07-07

## 2025-07-07 DIAGNOSIS — Z86.19 HISTORY OF SYPHILIS: Primary | ICD-10-CM

## 2025-07-17 ENCOUNTER — E-CONSULT (OUTPATIENT)
Dept: INFECTIOUS DISEASES | Facility: HOSPITAL | Age: 40
End: 2025-07-17
Payer: COMMERCIAL

## 2025-07-17 DIAGNOSIS — A53.0 POSITIVE RPR TEST: Primary | ICD-10-CM

## 2025-07-18 ENCOUNTER — PATIENT MESSAGE (OUTPATIENT)
Dept: OBSTETRICS AND GYNECOLOGY | Facility: CLINIC | Age: 40
End: 2025-07-18
Payer: COMMERCIAL

## 2025-07-18 DIAGNOSIS — Z86.19 HISTORY OF SYPHILIS: Primary | ICD-10-CM

## 2025-07-18 NOTE — CONSULTS
[facility] INFECTIOUS DISEASE  Response for E-Consult     Patient Name: Lynn Green  MRN: [id number]  Primary Care Provider: Reshma Prado MD   Requesting Provider: Esthela Quiles MD  E-Consult to Infectious Disease  Consult performed by: Alondra Mehta DO  Consult ordered by: Esthela Quiles MD      Recommendation: Do not need to repeat treatment at this time. Recommend repeating RPR titer every 3-6 months if patient is high risk for re-infection. If RPR titer increases 4-fold or higher, this suggests new exposure/infection, and would require re-treatment at that time.     Additional future steps to consider: repeat e-consult as needed    Total time of Consultation: 10 minute    I did not speak to the requesting provider verbally about this.     *This eConsult is based on the clinical data available to me and is furnished without benefit of a physical examination. The eConsult will need to be interpreted in light of any clinical issues or changes in patient status not available to me at the time of filing this eConsults. Significant changes in patient condition or level of acuity should result in immediate formal consultation and reevaluation. Please alert me if you have further questions.    Thank you for this eConsult referral.     DO MARV Berg [facility] INFECTIOUS DISEASE